# Patient Record
Sex: MALE | Race: WHITE | NOT HISPANIC OR LATINO
[De-identification: names, ages, dates, MRNs, and addresses within clinical notes are randomized per-mention and may not be internally consistent; named-entity substitution may affect disease eponyms.]

---

## 2018-03-15 ENCOUNTER — APPOINTMENT (OUTPATIENT)
Dept: INTERNAL MEDICINE | Facility: CLINIC | Age: 23
End: 2018-03-15
Payer: MEDICAID

## 2018-03-15 VITALS
SYSTOLIC BLOOD PRESSURE: 137 MMHG | TEMPERATURE: 99.2 F | OXYGEN SATURATION: 97 % | WEIGHT: 178 LBS | HEART RATE: 82 BPM | RESPIRATION RATE: 12 BRPM | DIASTOLIC BLOOD PRESSURE: 80 MMHG | BODY MASS INDEX: 26.98 KG/M2 | HEIGHT: 68 IN

## 2018-03-15 DIAGNOSIS — Q62.7 CONGENITAL VESICO-URETERO-RENAL REFLUX: ICD-10-CM

## 2018-03-15 DIAGNOSIS — Z00.00 ENCOUNTER FOR GENERAL ADULT MEDICAL EXAMINATION W/OUT ABNORMAL FINDINGS: ICD-10-CM

## 2018-03-15 PROCEDURE — 99385 PREV VISIT NEW AGE 18-39: CPT

## 2018-03-19 LAB
25(OH)D3 SERPL-MCNC: 20.6 NG/ML
ALBUMIN SERPL ELPH-MCNC: 4.9 G/DL
ALP BLD-CCNC: 66 U/L
ALT SERPL-CCNC: 15 U/L
ANION GAP SERPL CALC-SCNC: 17 MMOL/L
AST SERPL-CCNC: 26 U/L
BILIRUB SERPL-MCNC: 0.3 MG/DL
BUN SERPL-MCNC: 13 MG/DL
CALCIUM SERPL-MCNC: 10.1 MG/DL
CHLORIDE SERPL-SCNC: 99 MMOL/L
CHOLEST SERPL-MCNC: 214 MG/DL
CHOLEST/HDLC SERPL: 5.8 RATIO
CO2 SERPL-SCNC: 23 MMOL/L
CREAT SERPL-MCNC: 0.9 MG/DL
GLUCOSE SERPL-MCNC: 69 MG/DL
HBA1C MFR BLD HPLC: 5.4 %
HDLC SERPL-MCNC: 37 MG/DL
LDLC SERPL CALC-MCNC: 98 MG/DL
POTASSIUM SERPL-SCNC: 4 MMOL/L
PROT SERPL-MCNC: 8.5 G/DL
SODIUM SERPL-SCNC: 139 MMOL/L
TRIGL SERPL-MCNC: 393 MG/DL
TSH SERPL-ACNC: 1.44 UIU/ML
VIT B12 SERPL-MCNC: 766 PG/ML

## 2018-03-22 LAB
BASOPHILS # BLD AUTO: 0.05 K/UL
BASOPHILS NFR BLD AUTO: 0.7 %
EOSINOPHIL # BLD AUTO: 0.14 K/UL
EOSINOPHIL NFR BLD AUTO: 2 %
HCT VFR BLD CALC: 44.4 %
HGB BLD-MCNC: 15 G/DL
IMM GRANULOCYTES NFR BLD AUTO: 0.3 %
LYMPHOCYTES # BLD AUTO: 2.33 K/UL
LYMPHOCYTES NFR BLD AUTO: 32.5 %
MAN DIFF?: NORMAL
MCHC RBC-ENTMCNC: 27.6 PG
MCHC RBC-ENTMCNC: 33.8 GM/DL
MCV RBC AUTO: 81.6 FL
MONOCYTES # BLD AUTO: 0.71 K/UL
MONOCYTES NFR BLD AUTO: 9.9 %
NEUTROPHILS # BLD AUTO: 3.91 K/UL
NEUTROPHILS NFR BLD AUTO: 54.6 %
PLATELET # BLD AUTO: 238 K/UL
RBC # BLD: 5.44 M/UL
RBC # FLD: 13.3 %
WBC # FLD AUTO: 7.16 K/UL

## 2018-04-13 ENCOUNTER — APPOINTMENT (OUTPATIENT)
Dept: INTERNAL MEDICINE | Facility: CLINIC | Age: 23
End: 2018-04-13
Payer: MEDICAID

## 2018-04-13 VITALS
HEIGHT: 68 IN | BODY MASS INDEX: 26.67 KG/M2 | SYSTOLIC BLOOD PRESSURE: 128 MMHG | RESPIRATION RATE: 12 BRPM | HEART RATE: 88 BPM | OXYGEN SATURATION: 95 % | DIASTOLIC BLOOD PRESSURE: 79 MMHG | WEIGHT: 176 LBS | TEMPERATURE: 99.4 F

## 2018-04-13 PROCEDURE — 99213 OFFICE O/P EST LOW 20 MIN: CPT

## 2018-04-20 LAB
CHOLEST SERPL-MCNC: 255 MG/DL
CHOLEST/HDLC SERPL: 5.3 RATIO
HDLC SERPL-MCNC: 48 MG/DL
LDLC SERPL CALC-MCNC: 162 MG/DL
TRIGL SERPL-MCNC: 227 MG/DL

## 2018-07-16 LAB
CHOLEST SERPL-MCNC: 205 MG/DL
CHOLEST/HDLC SERPL: 4.5 RATIO
HDLC SERPL-MCNC: 46 MG/DL
LDLC SERPL CALC-MCNC: 123 MG/DL
TRIGL SERPL-MCNC: 181 MG/DL

## 2018-11-23 ENCOUNTER — EMERGENCY (EMERGENCY)
Facility: HOSPITAL | Age: 23
LOS: 1 days | Discharge: HOME | End: 2018-11-23
Attending: EMERGENCY MEDICINE
Payer: MEDICAID

## 2018-11-23 VITALS
DIASTOLIC BLOOD PRESSURE: 86 MMHG | SYSTOLIC BLOOD PRESSURE: 138 MMHG | WEIGHT: 173.94 LBS | RESPIRATION RATE: 17 BRPM | TEMPERATURE: 99 F | OXYGEN SATURATION: 100 % | HEART RATE: 84 BPM | HEIGHT: 69 IN

## 2018-11-23 LAB
ALBUMIN SERPL ELPH-MCNC: 4.4 G/DL — SIGNIFICANT CHANGE UP (ref 3.3–5)
ALP SERPL-CCNC: 58 U/L — SIGNIFICANT CHANGE UP (ref 40–120)
ALT FLD-CCNC: 14 U/L — SIGNIFICANT CHANGE UP (ref 10–45)
ANION GAP SERPL CALC-SCNC: 14 MMOL/L — SIGNIFICANT CHANGE UP (ref 5–17)
APTT BLD: 31.8 SEC — SIGNIFICANT CHANGE UP (ref 27.5–36.3)
AST SERPL-CCNC: 12 U/L — SIGNIFICANT CHANGE UP (ref 10–40)
BASOPHILS # BLD AUTO: 0 K/UL — SIGNIFICANT CHANGE UP (ref 0–0.2)
BASOPHILS NFR BLD AUTO: 0.3 % — SIGNIFICANT CHANGE UP (ref 0–2)
BILIRUB SERPL-MCNC: 0.2 MG/DL — SIGNIFICANT CHANGE UP (ref 0.2–1.2)
BLD GP AB SCN SERPL QL: NEGATIVE — SIGNIFICANT CHANGE UP
BUN SERPL-MCNC: 15 MG/DL — SIGNIFICANT CHANGE UP (ref 7–23)
CALCIUM SERPL-MCNC: 9.4 MG/DL — SIGNIFICANT CHANGE UP (ref 8.4–10.5)
CHLORIDE SERPL-SCNC: 102 MMOL/L — SIGNIFICANT CHANGE UP (ref 96–108)
CO2 SERPL-SCNC: 23 MMOL/L — SIGNIFICANT CHANGE UP (ref 22–31)
CREAT SERPL-MCNC: 0.94 MG/DL — SIGNIFICANT CHANGE UP (ref 0.5–1.3)
EOSINOPHIL # BLD AUTO: 0.1 K/UL — SIGNIFICANT CHANGE UP (ref 0–0.5)
EOSINOPHIL NFR BLD AUTO: 1.8 % — SIGNIFICANT CHANGE UP (ref 0–6)
GLUCOSE SERPL-MCNC: 100 MG/DL — HIGH (ref 70–99)
HCT VFR BLD CALC: 33.6 % — LOW (ref 39–50)
HGB BLD-MCNC: 12.1 G/DL — LOW (ref 13–17)
INR BLD: 0.99 RATIO — SIGNIFICANT CHANGE UP (ref 0.88–1.16)
LIDOCAIN IGE QN: 59 U/L — SIGNIFICANT CHANGE UP (ref 7–60)
LYMPHOCYTES # BLD AUTO: 1.7 K/UL — SIGNIFICANT CHANGE UP (ref 1–3.3)
LYMPHOCYTES # BLD AUTO: 35.1 % — SIGNIFICANT CHANGE UP (ref 13–44)
MCHC RBC-ENTMCNC: 28.3 PG — SIGNIFICANT CHANGE UP (ref 27–34)
MCHC RBC-ENTMCNC: 36 GM/DL — SIGNIFICANT CHANGE UP (ref 32–36)
MCV RBC AUTO: 78.7 FL — LOW (ref 80–100)
MONOCYTES # BLD AUTO: 0.4 K/UL — SIGNIFICANT CHANGE UP (ref 0–0.9)
MONOCYTES NFR BLD AUTO: 8.1 % — SIGNIFICANT CHANGE UP (ref 2–14)
NEUTROPHILS # BLD AUTO: 2.7 K/UL — SIGNIFICANT CHANGE UP (ref 1.8–7.4)
NEUTROPHILS NFR BLD AUTO: 54.7 % — SIGNIFICANT CHANGE UP (ref 43–77)
OB PNL STL: POSITIVE
PLATELET # BLD AUTO: 220 K/UL — SIGNIFICANT CHANGE UP (ref 150–400)
POTASSIUM SERPL-MCNC: 3.7 MMOL/L — SIGNIFICANT CHANGE UP (ref 3.5–5.3)
POTASSIUM SERPL-SCNC: 3.7 MMOL/L — SIGNIFICANT CHANGE UP (ref 3.5–5.3)
PROT SERPL-MCNC: 7.3 G/DL — SIGNIFICANT CHANGE UP (ref 6–8.3)
PROTHROM AB SERPL-ACNC: 11.3 SEC — SIGNIFICANT CHANGE UP (ref 10–12.9)
RBC # BLD: 4.27 M/UL — SIGNIFICANT CHANGE UP (ref 4.2–5.8)
RBC # FLD: 12.6 % — SIGNIFICANT CHANGE UP (ref 10.3–14.5)
RH IG SCN BLD-IMP: POSITIVE — SIGNIFICANT CHANGE UP
SODIUM SERPL-SCNC: 139 MMOL/L — SIGNIFICANT CHANGE UP (ref 135–145)
WBC # BLD: 4.9 K/UL — SIGNIFICANT CHANGE UP (ref 3.8–10.5)
WBC # FLD AUTO: 4.9 K/UL — SIGNIFICANT CHANGE UP (ref 3.8–10.5)

## 2018-11-23 PROCEDURE — 85730 THROMBOPLASTIN TIME PARTIAL: CPT

## 2018-11-23 PROCEDURE — 83690 ASSAY OF LIPASE: CPT

## 2018-11-23 PROCEDURE — 85610 PROTHROMBIN TIME: CPT

## 2018-11-23 PROCEDURE — 82272 OCCULT BLD FECES 1-3 TESTS: CPT

## 2018-11-23 PROCEDURE — 99284 EMERGENCY DEPT VISIT MOD MDM: CPT

## 2018-11-23 PROCEDURE — 80053 COMPREHEN METABOLIC PANEL: CPT

## 2018-11-23 PROCEDURE — 99283 EMERGENCY DEPT VISIT LOW MDM: CPT

## 2018-11-23 PROCEDURE — 86850 RBC ANTIBODY SCREEN: CPT

## 2018-11-23 PROCEDURE — 86900 BLOOD TYPING SEROLOGIC ABO: CPT

## 2018-11-23 PROCEDURE — 86901 BLOOD TYPING SEROLOGIC RH(D): CPT

## 2018-11-23 PROCEDURE — 85027 COMPLETE CBC AUTOMATED: CPT

## 2018-11-23 RX ORDER — ONDANSETRON 8 MG/1
4 TABLET, FILM COATED ORAL ONCE
Qty: 0 | Refills: 0 | Status: DISCONTINUED | OUTPATIENT
Start: 2018-11-23 | End: 2018-11-27

## 2018-11-23 RX ORDER — ACETAMINOPHEN 500 MG
975 TABLET ORAL ONCE
Qty: 0 | Refills: 0 | Status: DISCONTINUED | OUTPATIENT
Start: 2018-11-23 | End: 2018-11-27

## 2018-11-23 RX ORDER — SODIUM CHLORIDE 9 MG/ML
1000 INJECTION INTRAMUSCULAR; INTRAVENOUS; SUBCUTANEOUS ONCE
Qty: 0 | Refills: 0 | Status: DISCONTINUED | OUTPATIENT
Start: 2018-11-23 | End: 2018-11-27

## 2018-11-23 RX ORDER — SODIUM CHLORIDE 9 MG/ML
1000 INJECTION INTRAMUSCULAR; INTRAVENOUS; SUBCUTANEOUS ONCE
Qty: 0 | Refills: 0 | Status: COMPLETED | OUTPATIENT
Start: 2018-11-23 | End: 2018-11-23

## 2018-11-23 RX ADMIN — SODIUM CHLORIDE 2000 MILLILITER(S): 9 INJECTION INTRAMUSCULAR; INTRAVENOUS; SUBCUTANEOUS at 12:39

## 2018-11-23 NOTE — ED PROVIDER NOTE - PHYSICAL EXAMINATION
diffusely tender abd, no rebound, no guarding nondist  clear lung  heart  gross blood on rectal exam  otherwise rnormal rectal anus  pale  got sycnopal when he stood up fas

## 2018-11-23 NOTE — ED PROVIDER NOTE - PROGRESS NOTE DETAILS
Labs showed Hgb 12.1.  Reviewed historical chart in allscripts which showed Hgb ~15.  Last bloody bowel movement was last night so current Hgb is likely accurate.  Patient is feeling well, tolerating PO and looks comfortable in bed.  Received 2 L IVF.  VSS.  No BMs or bloody BMs in ED stay.  Patient can fu with his GI doctor who he sees regularly on Monday and will call to make appointment.  Will give patient his results from this ED visit.  Will give discharge instructions.

## 2018-11-23 NOTE — ED ADULT NURSE NOTE - NSIMPLEMENTINTERV_GEN_ALL_ED
Implemented All Universal Safety Interventions:  [place] to call system. Call bell, personal items and telephone within reach. Instruct patient to call for assistance. Room bathroom lighting operational. Non-slip footwear when patient is off stretcher. Physically safe environment: no spills, clutter or unnecessary equipment. Stretcher in lowest position, wheels locked, appropriate side rails in place.

## 2018-11-23 NOTE — ED PROVIDER NOTE - MEDICAL DECISION MAKING DETAILS
History of dieulafoy and hematochezia now with melena and hematochezia, symptoms of anemia.  Will vss, no hematemesis.  Will send fro cbc, cmp, coags, type and screen, five ivf.  Patient taking pantoprazole.

## 2018-11-23 NOTE — ED PROVIDER NOTE - ATTENDING CONTRIBUTION TO CARE
I performed a history and physical exam of the patient and discussed their management with the resident. I reviewed the resident's note and agree with the documented findings and plan of care.  Cookie Vasquez MD

## 2018-11-23 NOTE — ED PROVIDER NOTE - OBJECTIVE STATEMENT
22 y/o M with pmhx of asthma p/w rectal bleeding since endoscopy 2 weeks ago. Pt reports noticing dark stools with dark red blood. Had 7 bm's last night with last one being at 3 am today, noticed last bm with bright red blood. Has also been tired and lightheaded since yesterday. Reports intermittent palpitations and abdominal "pinching" on L side. Also notes rectal pain with bm. Pt has a hx of Dieulafoy's lesion. Denies fever, n/v, cp, sob. No recent abx use.  GI: Dr. Pedro Larose 22 y/o M with pmhx of asthma, Dieulafoy lesions with hematochezia and anemia occasionally requiring transfusions p/w rectal bleeding since endoscopy 2 weeks ago.  Pt reports noticing dark stools for past two weeks intermittently.  Last night, had 7 bm's with last one being at 3 am today, noticed last bm with bright red blood surrounding dark stool x1. Has also been tired and lightheaded since yesterday. Reports abdominal "pinching" sensation on L side. Also notes rectal pain with bowel movement that he thinks is 2/2 rawness from wiping.  Denies fever, n/v, cp, sob. No recent abx use or travel.  GI: Dr. Pedro Larose

## 2018-11-23 NOTE — ED PROVIDER NOTE - NS_ ATTENDINGSCRIBEDETAILS _ED_A_ED_FT
I performed a history and physical exam of the patient and discussed their management with the resident. I reviewed the scribe's note and agree with the documented findings and plan of care.  Cookie Vasquez MD

## 2018-11-23 NOTE — ED ADULT NURSE NOTE - OBJECTIVE STATEMENT
23 y.o male c c/o BRBPR. Pt had endoscopy 2 weeks ago for a preexisting ulcer. Pt was told to expect minimal rectal bleeding for 1 week. Stool was dark in appearance now transitioning into BRB. Tired and dizzy yesterday. Pt has had to get PRBC during an admission for low h/h. Father at bedside. Pt currently no on medication. Had been on Protonix in the past.

## 2018-11-27 ENCOUNTER — APPOINTMENT (OUTPATIENT)
Dept: INTERNAL MEDICINE | Facility: CLINIC | Age: 23
End: 2018-11-27
Payer: MEDICAID

## 2018-11-27 VITALS
SYSTOLIC BLOOD PRESSURE: 131 MMHG | HEART RATE: 99 BPM | WEIGHT: 166 LBS | OXYGEN SATURATION: 99 % | BODY MASS INDEX: 25.16 KG/M2 | RESPIRATION RATE: 12 BRPM | DIASTOLIC BLOOD PRESSURE: 84 MMHG | TEMPERATURE: 98.9 F | HEIGHT: 68 IN

## 2018-11-27 PROCEDURE — 99214 OFFICE O/P EST MOD 30 MIN: CPT

## 2018-11-28 ENCOUNTER — INPATIENT (INPATIENT)
Facility: HOSPITAL | Age: 23
LOS: 2 days | Discharge: HOME | DRG: 379 | End: 2018-12-01
Attending: HOSPITALIST | Admitting: INTERNAL MEDICINE
Payer: MEDICAID

## 2018-11-28 VITALS
OXYGEN SATURATION: 100 % | RESPIRATION RATE: 20 BRPM | HEIGHT: 69 IN | HEART RATE: 129 BPM | TEMPERATURE: 98 F | DIASTOLIC BLOOD PRESSURE: 84 MMHG | WEIGHT: 166.01 LBS | SYSTOLIC BLOOD PRESSURE: 119 MMHG

## 2018-11-28 DIAGNOSIS — D64.9 ANEMIA, UNSPECIFIED: ICD-10-CM

## 2018-11-28 LAB
ALBUMIN SERPL ELPH-MCNC: 4.3 G/DL — SIGNIFICANT CHANGE UP (ref 3.3–5)
ALP SERPL-CCNC: 52 U/L — SIGNIFICANT CHANGE UP (ref 40–120)
ALT FLD-CCNC: 10 U/L — SIGNIFICANT CHANGE UP (ref 10–45)
ANION GAP SERPL CALC-SCNC: 15 MMOL/L — SIGNIFICANT CHANGE UP (ref 5–17)
APTT BLD: 29.7 SEC — SIGNIFICANT CHANGE UP (ref 27.5–36.3)
AST SERPL-CCNC: 11 U/L — SIGNIFICANT CHANGE UP (ref 10–40)
BASE EXCESS BLDV CALC-SCNC: 2.4 MMOL/L — HIGH (ref -2–2)
BASOPHILS # BLD AUTO: 0.1 K/UL — SIGNIFICANT CHANGE UP (ref 0–0.2)
BASOPHILS NFR BLD AUTO: 0.9 % — SIGNIFICANT CHANGE UP (ref 0–2)
BILIRUB SERPL-MCNC: 0.3 MG/DL — SIGNIFICANT CHANGE UP (ref 0.2–1.2)
BLD GP AB SCN SERPL QL: NEGATIVE — SIGNIFICANT CHANGE UP
BUN SERPL-MCNC: 17 MG/DL — SIGNIFICANT CHANGE UP (ref 7–23)
CA-I SERPL-SCNC: 1.22 MMOL/L — SIGNIFICANT CHANGE UP (ref 1.12–1.3)
CALCIUM SERPL-MCNC: 9.2 MG/DL — SIGNIFICANT CHANGE UP (ref 8.4–10.5)
CHLORIDE BLDV-SCNC: 103 MMOL/L — SIGNIFICANT CHANGE UP (ref 96–108)
CHLORIDE SERPL-SCNC: 102 MMOL/L — SIGNIFICANT CHANGE UP (ref 96–108)
CO2 BLDV-SCNC: 28 MMOL/L — SIGNIFICANT CHANGE UP (ref 22–30)
CO2 SERPL-SCNC: 23 MMOL/L — SIGNIFICANT CHANGE UP (ref 22–31)
CREAT SERPL-MCNC: 1 MG/DL — SIGNIFICANT CHANGE UP (ref 0.5–1.3)
EOSINOPHIL # BLD AUTO: 0.1 K/UL — SIGNIFICANT CHANGE UP (ref 0–0.5)
EOSINOPHIL NFR BLD AUTO: 1.2 % — SIGNIFICANT CHANGE UP (ref 0–6)
GAS PNL BLDV: 137 MMOL/L — SIGNIFICANT CHANGE UP (ref 136–145)
GAS PNL BLDV: SIGNIFICANT CHANGE UP
GAS PNL BLDV: SIGNIFICANT CHANGE UP
GLUCOSE BLDV-MCNC: 105 MG/DL — HIGH (ref 70–99)
GLUCOSE SERPL-MCNC: 111 MG/DL — HIGH (ref 70–99)
HCO3 BLDV-SCNC: 27 MMOL/L — SIGNIFICANT CHANGE UP (ref 21–29)
HCT VFR BLD CALC: 26.1 % — LOW (ref 39–50)
HCT VFR BLDA CALC: 28 % — LOW (ref 39–50)
HGB BLD CALC-MCNC: 9 G/DL — LOW (ref 13–17)
HGB BLD-MCNC: 9.3 G/DL — LOW (ref 13–17)
INR BLD: 1.13 RATIO — SIGNIFICANT CHANGE UP (ref 0.88–1.16)
LACTATE BLDV-MCNC: 1.4 MMOL/L — SIGNIFICANT CHANGE UP (ref 0.7–2)
LIDOCAIN IGE QN: 17 U/L — SIGNIFICANT CHANGE UP (ref 7–60)
LYMPHOCYTES # BLD AUTO: 1.7 K/UL — SIGNIFICANT CHANGE UP (ref 1–3.3)
LYMPHOCYTES # BLD AUTO: 29.9 % — SIGNIFICANT CHANGE UP (ref 13–44)
MCHC RBC-ENTMCNC: 27.9 PG — SIGNIFICANT CHANGE UP (ref 27–34)
MCHC RBC-ENTMCNC: 35.7 GM/DL — SIGNIFICANT CHANGE UP (ref 32–36)
MCV RBC AUTO: 78.1 FL — LOW (ref 80–100)
MONOCYTES # BLD AUTO: 0.5 K/UL — SIGNIFICANT CHANGE UP (ref 0–0.9)
MONOCYTES NFR BLD AUTO: 9.2 % — SIGNIFICANT CHANGE UP (ref 2–14)
NEUTROPHILS # BLD AUTO: 3.4 K/UL — SIGNIFICANT CHANGE UP (ref 1.8–7.4)
NEUTROPHILS NFR BLD AUTO: 58.7 % — SIGNIFICANT CHANGE UP (ref 43–77)
PCO2 BLDV: 45 MMHG — SIGNIFICANT CHANGE UP (ref 35–50)
PH BLDV: 7.39 — SIGNIFICANT CHANGE UP (ref 7.35–7.45)
PLATELET # BLD AUTO: 255 K/UL — SIGNIFICANT CHANGE UP (ref 150–400)
PO2 BLDV: 21 MMHG — SIGNIFICANT CHANGE UP (ref 25–45)
POTASSIUM BLDV-SCNC: 3.1 MMOL/L — LOW (ref 3.5–5.3)
POTASSIUM SERPL-MCNC: 3.4 MMOL/L — LOW (ref 3.5–5.3)
POTASSIUM SERPL-SCNC: 3.4 MMOL/L — LOW (ref 3.5–5.3)
PROT SERPL-MCNC: 6.8 G/DL — SIGNIFICANT CHANGE UP (ref 6–8.3)
PROTHROM AB SERPL-ACNC: 13 SEC — HIGH (ref 10–12.9)
RBC # BLD: 3.34 M/UL — LOW (ref 4.2–5.8)
RBC # FLD: 12.4 % — SIGNIFICANT CHANGE UP (ref 10.3–14.5)
RH IG SCN BLD-IMP: POSITIVE — SIGNIFICANT CHANGE UP
SAO2 % BLDV: 25 % — LOW (ref 67–88)
SODIUM SERPL-SCNC: 140 MMOL/L — SIGNIFICANT CHANGE UP (ref 135–145)
WBC # BLD: 5.7 K/UL — SIGNIFICANT CHANGE UP (ref 3.8–10.5)
WBC # FLD AUTO: 5.7 K/UL — SIGNIFICANT CHANGE UP (ref 3.8–10.5)

## 2018-11-28 PROCEDURE — 99285 EMERGENCY DEPT VISIT HI MDM: CPT

## 2018-11-28 PROCEDURE — 71046 X-RAY EXAM CHEST 2 VIEWS: CPT | Mod: 26

## 2018-11-28 RX ORDER — PANTOPRAZOLE SODIUM 20 MG/1
40 TABLET, DELAYED RELEASE ORAL ONCE
Qty: 0 | Refills: 0 | Status: COMPLETED | OUTPATIENT
Start: 2018-11-28 | End: 2018-11-28

## 2018-11-28 RX ADMIN — PANTOPRAZOLE SODIUM 40 MILLIGRAM(S): 20 TABLET, DELAYED RELEASE ORAL at 20:40

## 2018-11-28 NOTE — PHYSICAL EXAM
[No Acute Distress] : no acute distress [Well Nourished] : well nourished [Well Developed] : well developed [Well-Appearing] : well-appearing [Normal Sclera/Conjunctiva] : normal sclera/conjunctiva [Normal Outer Ear/Nose] : the outer ears and nose were normal in appearance [No JVD] : no jugular venous distention [No Lymphadenopathy] : no lymphadenopathy [No Respiratory Distress] : no respiratory distress  [Clear to Auscultation] : lungs were clear to auscultation bilaterally [No Accessory Muscle Use] : no accessory muscle use [Normal Rate] : normal rate  [Regular Rhythm] : with a regular rhythm [Normal S1, S2] : normal S1 and S2 [No Murmur] : no murmur heard [No Edema] : there was no peripheral edema [No Extremity Clubbing/Cyanosis] : no extremity clubbing/cyanosis [Soft] : abdomen soft [Non Tender] : non-tender [Non-distended] : non-distended [No HSM] : no HSM [Normal Bowel Sounds] : normal bowel sounds [Normal Anterior Cervical Nodes] : no anterior cervical lymphadenopathy [No CVA Tenderness] : no CVA  tenderness [No Joint Swelling] : no joint swelling [No Rash] : no rash [Normal Gait] : normal gait [No Focal Deficits] : no focal deficits [Speech Grossly Normal] : speech grossly normal [Normal Affect] : the affect was normal [Normal Insight/Judgement] : insight and judgment were intact

## 2018-11-28 NOTE — ED ADULT TRIAGE NOTE - CHIEF COMPLAINT QUOTE
pt states, "I was here x4 days ago for rectal bleeding. monday I had an episode but today it got worse. it started out dark but now is bright red"

## 2018-11-28 NOTE — ED PROVIDER NOTE - MEDICAL DECISION MAKING DETAILS
Continuation of GI bleed, worsening symptoms. Possibly from Dieulafoy lesions(last endoscopy with lesion in stomach, cauterized 2015). Cant get GI f/u as GI doc on vacation. Check labs, protonix. Admit. Continuation of GI bleed, worsening symptoms. Possibly from Dieulafoy lesions(last endoscopy with lesion in stomach, cauterized 2015). Cant get GI f/u as GI doc on vacation. Check labs, protonix. Admit.    Attending MD Carvajal: 24 y/o M with pmhx of asthma, Dieulafoy lesions(last endoscopy with lesion in stomach, cauterized), anemia occasionally requiring transfusions p/w rectal bleeding. Seen in ED 4 days ago for BRBPR and hgb stable at that time.  Discharged for GI follow up but had another episode of BRBPR today and felt worsening fatigue and came in for evaluation.  On exam the abd benign + BRBPR.  Will send CBC, type and screen and transfuse as needed.  PPI.

## 2018-11-28 NOTE — ED ADULT NURSE NOTE - NSIMPLEMENTINTERV_GEN_ALL_ED
Implemented All Universal Safety Interventions:  [place] to call system. Call bell, personal items and telephone within reach. Instruct patient to call for assistance. Room bathroom lighting operational. Non-slip footwear when patient is off stretcher. Physically safe environment: no spills, clutter or unnecessary equipment. Stretcher in lowest position, wheels locked, appropriate side rails in place.

## 2018-11-28 NOTE — ED ADULT NURSE NOTE - OBJECTIVE STATEMENT
24 y/o male, a&o x3, c/o dark stools with generalized abd discomfort x3 days. Pt has hx GI bleed in past requiring transfusion. Denies headache, weakness, dizziness, fevers, or chills. Breathing even, full, unlabored, no cough, dyspnea upon exertion resolved at rest. Abdomen soft, nondistended, no constipation, no diarrhea, no nausea, no vomiting, no urinary complaints. Skin warm, dry, pale. Stretcher locked in low position. Advised of plan of care. Family at bedside.

## 2018-11-28 NOTE — ED PROVIDER NOTE - ATTENDING CONTRIBUTION TO CARE
Attending MD Carvajal:  I personally have seen and examined this patient.  Resident note reviewed and agree on plan of care and except where noted.  See MDM for details.

## 2018-11-28 NOTE — ED PROVIDER NOTE - PHYSICAL EXAMINATION
Gen: No acute distress, alert, cooperative, Pale  Head: Normocephalic, Atraumatic  HEENT: PERRL, oral mucosa moist, normal conjunctiva  Lung: CTAB, no respiratory distress, no crackles or wheezes  CV: rrr, no murmur  Abd: soft, diffuse mild tenderness, ND, no rebound or guarding  MSK: No LE edema, no visible deformities  Neuro: No focal neurologic deficits  Skin: Warm and dry, no evidence of rash, PALE  Psych: normal affect, follows commands

## 2018-11-28 NOTE — ED ADULT NURSE REASSESSMENT NOTE - NS ED NURSE REASSESS COMMENT FT1
Pt a&o x3, resting on stretcher, in no acute distress, vital signs stable. PRBC's initiated. Report given to floor RN. 15 min post transfusion start vitals to be performed prior to pt going upstairs. Stretcher locked in low position. Advised of plan of care.

## 2018-11-28 NOTE — ED PROVIDER NOTE - OBJECTIVE STATEMENT
22 y/o M with pmhx of asthma, Dieulafoy lesions(last endoscopy with lesion in stomach, cauterized), anemia occasionally requiring transfusions p/w rectal bleeding. Was seen in the ED 4 days ago for same complaint and discharged with GI f/u. Had not had any bleeding until today when he saw dark stool and bright red blood. Also has worsening weakness and fatigue. Patient has abdominal pain, similar to ED 4 days ago. Today noted some new mild intermittent chest pain, currently cp free.  Denies fever, n/v, sob. No recent abx use or travel.

## 2018-11-28 NOTE — HISTORY OF PRESENT ILLNESS
[de-identified] : 22 yo male, presents for post ER visit follow up.\par Pt states he had black stool last Thursday night and later on in the night had some bright blood mixed in. He went to [facility]  ER on Friday He was evaluated in the ER, had blood work done, given IV medicine and sent home\par Denies having abdominal pain, diarrhea. Had small bowel movement yesterday and as per pt was still black.  He says he is feeling weak, has been eating bland diet and has been drinking a lot of fluid\par He has hx of gastric bleed 3 years ago "from a duLefois lesion". he  follows with GI and had endoscopy on Oct 30th \par

## 2018-11-29 DIAGNOSIS — D64.9 ANEMIA, UNSPECIFIED: ICD-10-CM

## 2018-11-29 DIAGNOSIS — K62.5 HEMORRHAGE OF ANUS AND RECTUM: ICD-10-CM

## 2018-11-29 DIAGNOSIS — N28.9 DISORDER OF KIDNEY AND URETER, UNSPECIFIED: Chronic | ICD-10-CM

## 2018-11-29 DIAGNOSIS — Z29.9 ENCOUNTER FOR PROPHYLACTIC MEASURES, UNSPECIFIED: ICD-10-CM

## 2018-11-29 DIAGNOSIS — K31.82 DIEULAFOY LESION (HEMORRHAGIC) OF STOMACH AND DUODENUM: ICD-10-CM

## 2018-11-29 DIAGNOSIS — J45.909 UNSPECIFIED ASTHMA, UNCOMPLICATED: ICD-10-CM

## 2018-11-29 LAB
ANION GAP SERPL CALC-SCNC: 16 MMOL/L — SIGNIFICANT CHANGE UP (ref 5–17)
APTT BLD: 31 SEC — SIGNIFICANT CHANGE UP (ref 27.5–36.3)
BUN SERPL-MCNC: 18 MG/DL — SIGNIFICANT CHANGE UP (ref 7–23)
CALCIUM SERPL-MCNC: 9.1 MG/DL — SIGNIFICANT CHANGE UP (ref 8.4–10.5)
CHLORIDE SERPL-SCNC: 103 MMOL/L — SIGNIFICANT CHANGE UP (ref 96–108)
CO2 SERPL-SCNC: 21 MMOL/L — LOW (ref 22–31)
CREAT SERPL-MCNC: 1.09 MG/DL — SIGNIFICANT CHANGE UP (ref 0.5–1.3)
GLUCOSE SERPL-MCNC: 90 MG/DL — SIGNIFICANT CHANGE UP (ref 70–99)
HCT VFR BLD CALC: 27.3 % — LOW (ref 39–50)
HCT VFR BLD CALC: 31.1 % — LOW (ref 39–50)
HGB BLD-MCNC: 10 G/DL — LOW (ref 13–17)
HGB BLD-MCNC: 11.1 G/DL — LOW (ref 13–17)
MCHC RBC-ENTMCNC: 28.4 PG — SIGNIFICANT CHANGE UP (ref 27–34)
MCHC RBC-ENTMCNC: 29 PG — SIGNIFICANT CHANGE UP (ref 27–34)
MCHC RBC-ENTMCNC: 35.7 GM/DL — SIGNIFICANT CHANGE UP (ref 32–36)
MCHC RBC-ENTMCNC: 36.5 GM/DL — HIGH (ref 32–36)
MCV RBC AUTO: 79.3 FL — LOW (ref 80–100)
MCV RBC AUTO: 79.6 FL — LOW (ref 80–100)
PLATELET # BLD AUTO: 237 K/UL — SIGNIFICANT CHANGE UP (ref 150–400)
PLATELET # BLD AUTO: 249 K/UL — SIGNIFICANT CHANGE UP (ref 150–400)
POTASSIUM SERPL-MCNC: 4 MMOL/L — SIGNIFICANT CHANGE UP (ref 3.5–5.3)
POTASSIUM SERPL-SCNC: 4 MMOL/L — SIGNIFICANT CHANGE UP (ref 3.5–5.3)
RBC # BLD: 3.45 M/UL — LOW (ref 4.2–5.8)
RBC # BLD: 3.91 M/UL — LOW (ref 4.2–5.8)
RBC # FLD: 12.5 % — SIGNIFICANT CHANGE UP (ref 10.3–14.5)
RBC # FLD: 13 % — SIGNIFICANT CHANGE UP (ref 10.3–14.5)
SODIUM SERPL-SCNC: 140 MMOL/L — SIGNIFICANT CHANGE UP (ref 135–145)
WBC # BLD: 6 K/UL — SIGNIFICANT CHANGE UP (ref 3.8–10.5)
WBC # BLD: 6.4 K/UL — SIGNIFICANT CHANGE UP (ref 3.8–10.5)
WBC # FLD AUTO: 6 K/UL — SIGNIFICANT CHANGE UP (ref 3.8–10.5)
WBC # FLD AUTO: 6.4 K/UL — SIGNIFICANT CHANGE UP (ref 3.8–10.5)

## 2018-11-29 PROCEDURE — 12345: CPT | Mod: NC,GC

## 2018-11-29 PROCEDURE — 99223 1ST HOSP IP/OBS HIGH 75: CPT | Mod: GC

## 2018-11-29 RX ORDER — PANTOPRAZOLE SODIUM 20 MG/1
40 TABLET, DELAYED RELEASE ORAL
Qty: 0 | Refills: 0 | Status: DISCONTINUED | OUTPATIENT
Start: 2018-11-29 | End: 2018-11-29

## 2018-11-29 RX ORDER — PANTOPRAZOLE SODIUM 20 MG/1
8 TABLET, DELAYED RELEASE ORAL
Qty: 80 | Refills: 0 | Status: DISCONTINUED | OUTPATIENT
Start: 2018-11-29 | End: 2018-11-30

## 2018-11-29 RX ORDER — POTASSIUM CHLORIDE 20 MEQ
40 PACKET (EA) ORAL ONCE
Qty: 0 | Refills: 0 | Status: COMPLETED | OUTPATIENT
Start: 2018-11-29 | End: 2018-11-29

## 2018-11-29 RX ADMIN — PANTOPRAZOLE SODIUM 10 MG/HR: 20 TABLET, DELAYED RELEASE ORAL at 03:09

## 2018-11-29 RX ADMIN — Medication 40 MILLIEQUIVALENT(S): at 02:46

## 2018-11-29 NOTE — H&P ADULT - PROBLEM SELECTOR PLAN 1
Patient presenting with 1 week history of intermittent melena with BRB, found to have Hgb of 9.3, had been 12.1 on 11/23  - Currently hemodynamically stable, breathing comfortably on RA  - recently taken off of chronic PPI by outpatient GI  - likely secondary to UGIB, ulcer vs Dieulafoy lesion (although negative EGD a couple of weeks ago, may have manipulated old lesion)  - will call GI for possible EGD, keep NPO  - will transfuse 1 unit of pRBC's and follow up CBC afterwards if another unit is necessary   - s/p Protonix 40 IV in ED, will c/w Protonix 40 IV BID   - will continue to monitor vitals q 4, will check set of orthostatics   - active type and screen Patient presenting with 1 week history of intermittent melena with BRB, found to have Hgb of 9.3, had been 12.1 on 11/23  - FOBT +  - Currently hemodynamically stable, breathing comfortably on RA  - recently taken off of chronic PPI by outpatient GI  - likely secondary to UGIB, ulcer vs Dieulafoy lesion (although negative EGD a couple of weeks ago, may have manipulated old lesion)  - will call GI for possible EGD, keep NPO  - will transfuse 1 unit of pRBC's and follow up CBC afterwards if another unit is necessary   - s/p Protonix 40 IV in ED, will c/w Protonix 40 IV BID   - will continue to monitor vitals q 4, will check set of orthostatics   - active type and screen Patient presenting with 1 week history of intermittent melena with BRB, found to have Hgb of 9.3, had been 12.1 on 11/23  - FOBT +  - Currently hemodynamically stable, but had been tachycardic, breathing comfortably on RA  - recently taken off of chronic PPI by outpatient GI  - likely secondary to UGIB, ulcer vs Dieulafoy lesion (although negative EGD a couple of weeks ago, may have manipulated old lesion)  - will consult GI for possible EGD, keep NPO  - will transfuse 1 unit of pRBC's and follow up CBC afterwards if another unit is necessary   - s/p Protonix 40 IV in ED, will start PPI drip, and then transition to Protonix 40 IV BID   - will continue to monitor vitals q 4, will check set of orthostatics   - active type and screen Patient presenting with 1 week history of intermittent melena with BRBPR/dark stools, found to have Hgb of 9.3, had been 12.1 on 11/23  - FOBT +  - Currently hemodynamically stable, but had been tachycardic, breathing comfortably on RA  - recently taken off of chronic PPI by outpatient GI  - likely secondary to UGIB, ulcer vs Dieulafoy lesion (although negative EGD a couple of weeks ago, may have manipulated old lesion)  - will consult GI for possible EGD, keep NPO  - will transfuse 1 unit of pRBC's and follow up CBC afterwards if another unit is necessary   - s/p Protonix 40 IV in ED, will start PPI drip, and then transition to Protonix 40 IV BID   - will continue to monitor vitals q 4, will check set of orthostatics   - active type and screen

## 2018-11-29 NOTE — PROGRESS NOTE ADULT - PROBLEM SELECTOR PLAN 4
Patient with stable intermittent asthma, has not used Ventolin PRN in a long time  - currently without any symptoms

## 2018-11-29 NOTE — PROGRESS NOTE ADULT - ASSESSMENT
24 y/o Male with PMHx of asthma, GERD, Dieulafoy lesions (GI bleeding 3 years ago requiring 2 transfusions, EGD showed lesion in stomach, which was cauterized) presents with 1 week history of melena intermixed with BRB, found to have Hgb of 9.3, likely secondary to upper GI bleed.

## 2018-11-29 NOTE — H&P ADULT - NSHPREVIEWOFSYSTEMS_GEN_ALL_CORE
Review of Systems:  Constitutional: No fever, No weight loss  Eyes: No blurry vision, No diplopia  Neuro: No tremors, No muscle weakness   Cardiovascular: No chest pain, No palpitations  Respiratory: +PURVIS, No cough  GI: + nausea, No vomiting, +melena  : No dysuria, No hematuria  Skin: No rash

## 2018-11-29 NOTE — H&P ADULT - PROBLEM SELECTOR PLAN 3
Patient with history of Dieulafoy lesion 3 years which cause acute blood loss anemia requiring 2 units of pRBC's  - Plan as above for current GI bleed

## 2018-11-29 NOTE — CONSULT NOTE ADULT - ASSESSMENT
Impression:  1) Melena/hematochezia - 2/2 GIB DDx includes PUD, angiectasias,  Dieulafoy lesions (especially given the ptients history).  Given mix of melena and BRB lower GI, especially right sided lesions is also possible.    Recommendations:   - monitor H/H   - transfuse if hemoglobin <7   - PPI gtt    Zoila Herron, PGY-4  Gastroenterology Fellow  Pager x [phone] or [phone]  (After 5 pm or on weekends please page GI on call) Impression:  1) Melena/hematochezia - 2/2 GIB DDx includes PUD, angiectasias,  Dieulafoy lesions (especially given the ptients history).  Given mix of melena and BRB lower GI, especially right sided lesions is also possible.    Recommendations:   - monitor H/H   - transfuse if hemoglobin <7   - PPI gtt   - EGD tomorrow    Zoila Herron, PGY-4  Gastroenterology Fellow  Pager x [phone] or [phone]  (After 5 pm or on weekends please page GI on call)

## 2018-11-29 NOTE — H&P ADULT - FAMILY HISTORY
Grandparent  Still living? Unknown  Family history of diabetes mellitus, Age at diagnosis: Age Unknown     Uncle  Still living? Unknown  Family history of acute myocardial infarction, Age at diagnosis: Age Unknown

## 2018-11-29 NOTE — PROGRESS NOTE ADULT - SUBJECTIVE AND OBJECTIVE BOX
Alexey Chau MD PGY1  [phone]  Medicine Team 4      Patient is a 23y old  Male who presents with a chief complaint of GI bleeding (29 Nov 2018 01:39)      SUBJECTIVE / OVERNIGHT EVENTS:    MEDICATIONS  (STANDING):  pantoprazole Infusion 8 mG/Hr (10 mL/Hr) IV Continuous <Continuous>    MEDICATIONS  (PRN):      Vital Signs Last 24 Hrs  T(C): 37.2 (29 Nov 2018 02:05), Max: 37.6 (28 Nov 2018 22:14)  T(F): 98.9 (29 Nov 2018 02:05), Max: 99.6 (28 Nov 2018 22:14)  HR: 84 (29 Nov 2018 02:05) (84 - 129)  BP: 124/75 (29 Nov 2018 02:05) (119/84 - 134/76)  BP(mean): --  RR: 16 (29 Nov 2018 02:05) (16 - 20)  SpO2: 98% (29 Nov 2018 02:05) (98% - 100%)  CAPILLARY BLOOD GLUCOSE        I&O's Summary    28 Nov 2018 07:01  -  29 Nov 2018 07:00  --------------------------------------------------------  IN: 50 mL / OUT: 0 mL / NET: 50 mL        PHYSICAL EXAM:  GENERAL: NAD, well-developed  HEAD:  Atraumatic, Normocephalic  EYES: EOMI, PERRLA, conjunctiva and sclera clear  NECK: Supple, No JVD  CHEST/LUNG: Clear to auscultation bilaterally; No wheeze  HEART: Regular rate and rhythm; No murmurs, rubs, or gallops  ABDOMEN: Soft, Nontender, Nondistended; Bowel sounds present  EXTREMITIES:  2+ Peripheral Pulses, No clubbing, cyanosis, or edema  PSYCH: AAOx3  NEUROLOGY: non-focal  SKIN: No rashes or lesions    LABS:                        10.0   6.4   )-----------( 237      ( 29 Nov 2018 04:14 )             27.3     11-29    140  |  103  |  18  ----------------------------<  90  4.0   |  21<L>  |  1.09    Ca    9.1      29 Nov 2018 04:14    TPro  6.8  /  Alb  4.3  /  TBili  0.3  /  DBili  x   /  AST  11  /  ALT  10  /  AlkPhos  52  11-28    PT/INR - ( 28 Nov 2018 20:51 )   PT: 13.0 sec;   INR: 1.13 ratio         PTT - ( 28 Nov 2018 20:51 )  PTT:29.7 sec          RADIOLOGY & ADDITIONAL TESTS:    Imaging Personally Reviewed:    Consultant(s) Notes Reviewed:      Care Discussed with Consultants/Other Providers: Alexey Chau MD PGY1  [phone]  Medicine Team 4      Patient is a 23y old  Male who presents with a chief complaint of GI bleeding (29 Nov 2018 01:39)      SUBJECTIVE / OVERNIGHT EVENTS:  No acute overnight events.   Denies any CP, SOB, N/V, abd pain, F/C. Pt said he had small bm last night that was dark. No bright red blood. Pt did not have bm since. No hematemesis    MEDICATIONS  (STANDING):  pantoprazole Infusion 8 mG/Hr (10 mL/Hr) IV Continuous <Continuous>    MEDICATIONS  (PRN):      Vital Signs Last 24 Hrs  T(C): 37.2 (29 Nov 2018 02:05), Max: 37.6 (28 Nov 2018 22:14)  T(F): 98.9 (29 Nov 2018 02:05), Max: 99.6 (28 Nov 2018 22:14)  HR: 84 (29 Nov 2018 02:05) (84 - 129)  BP: 124/75 (29 Nov 2018 02:05) (119/84 - 134/76)  BP(mean): --  RR: 16 (29 Nov 2018 02:05) (16 - 20)  SpO2: 98% (29 Nov 2018 02:05) (98% - 100%)  CAPILLARY BLOOD GLUCOSE        I&O's Summary    28 Nov 2018 07:01  -  29 Nov 2018 07:00  --------------------------------------------------------  IN: 50 mL / OUT: 0 mL / NET: 50 mL        PHYSICAL EXAM:  GENERAL: NAD, well-developed  HEAD:  Atraumatic, Normocephalic  EYES: EOMI, PERRLA, conjunctiva and sclera clear  NECK: Supple, No JVD  CHEST/LUNG: Clear to auscultation bilaterally; No wheeze  HEART: Regular rate and rhythm; No murmurs, rubs, or gallops  ABDOMEN: Soft, Nontender, Nondistended; Bowel sounds present  EXTREMITIES:  2+ Peripheral Pulses, No clubbing, cyanosis, or edema  PSYCH: AAOx3  NEUROLOGY: non-focal  SKIN: No rashes or lesions    LABS:                        10.0   6.4   )-----------( 237      ( 29 Nov 2018 04:14 )             27.3     11-29    140  |  103  |  18  ----------------------------<  90  4.0   |  21<L>  |  1.09    Ca    9.1      29 Nov 2018 04:14    TPro  6.8  /  Alb  4.3  /  TBili  0.3  /  DBili  x   /  AST  11  /  ALT  10  /  AlkPhos  52  11-28    PT/INR - ( 28 Nov 2018 20:51 )   PT: 13.0 sec;   INR: 1.13 ratio         PTT - ( 28 Nov 2018 20:51 )  PTT:29.7 sec          RADIOLOGY & ADDITIONAL TESTS:    Imaging Personally Reviewed:    Consultant(s) Notes Reviewed:      Care Discussed with Consultants/Other Providers:

## 2018-11-29 NOTE — PROGRESS NOTE ADULT - PROBLEM SELECTOR PLAN 5
DVT ppx: SCD's in setting of GI bleed    Delmy Gleason M.D.   PGY-2 | Internal Medicine   [phone] | [id number] DVT ppx: SCD's in setting of GI bleed

## 2018-11-29 NOTE — H&P ADULT - ASSESSMENT
22 y/o Male with PMHx of asthma, GERD, Dieulafoy lesions (GI bleeding 3 years ago requiring 2 transfusions, EGD showed lesion in stomach, which was cauterized) presents with 1 week history of melena intermixed with BRB, found to have Hgb of 9.3, likely secondary to upper GI bleed.

## 2018-11-29 NOTE — H&P ADULT - NSHPLABSRESULTS_GEN_ALL_CORE
LABS AND IMAGING PERSONALLY REVIEWED:                            9.3    5.7   )-----------( 255      ( 28 Nov 2018 20:51 )             26.1       11-28    140  |  102  |  17  ----------------------------<  111<H>  3.4<L>   |  23  |  1.00    Ca    9.2      28 Nov 2018 20:51    TPro  6.8  /  Alb  4.3  /  TBili  0.3  /  DBili  x   /  AST  11  /  ALT  10  /  AlkPhos  52  11-28                  PT/INR - ( 28 Nov 2018 20:51 )   PT: 13.0 sec;   INR: 1.13 ratio         PTT - ( 28 Nov 2018 20:51 )  PTT:29.7 sec    Lipase: 17    < from: Xray Chest 2 Views PA/Lat (11.28.18 @ 21:29) >      INTERPRETATION:  Clear lungs.  Please follow-up official report.      < end of copied text > LABS AND IMAGING PERSONALLY REVIEWED:                            9.3    5.7   )-----------( 255      ( 28 Nov 2018 20:51 )             26.1       11-28    140  |  102  |  17  ----------------------------<  111<H>  3.4<L>   |  23  |  1.00    Ca    9.2      28 Nov 2018 20:51    TPro  6.8  /  Alb  4.3  /  TBili  0.3  /  DBili  x   /  AST  11  /  ALT  10  /  AlkPhos  52  11-28                  PT/INR - ( 28 Nov 2018 20:51 )   PT: 13.0 sec;   INR: 1.13 ratio         PTT - ( 28 Nov 2018 20:51 )  PTT:29.7 sec    Lipase: 17      FOBT: +    < from: Xray Chest 2 Views PA/Lat (11.28.18 @ 21:29) >      INTERPRETATION:  Clear lungs.  Please follow-up official report.      < end of copied text >

## 2018-11-29 NOTE — H&P ADULT - HISTORY OF PRESENT ILLNESS
Delmy Gleason M.D.   PGY-2 | Internal Medicine   [phone] | [id number]    24 y/o Male with PMHx of asthma, GERD, Dieulafoy lesions (GI bleeding 3 years ago requiring 2 transfusions, EGD showed lesion in stomach, which was cauterized) presents with 1 week history of rectal bleeding. Patient says that he first noticed the bleeding last Thursday, came to the ED, where he was found to have a Hgb of 12.1, and was discharged to follow up with outpatient GI. Patient says that the bleeding resolved, and then started again yesterday. Patient says that stool is loose and dark, with some intermixed bright red blood.  Patient says that he has had some pinching abdominal pain in the umbilical area associated with the bowel movements. Patient with some nausea, but no vomiting. Patient says that he feels slightly lightheaded after the bowel movements, currently without dizziness/lightheadedeness. Since last Thursday, has been feeling some PURVIS. Patient says that he had been on a PPI since his bleeding event 3 years ago, but was stopped a month ago, as his GI physician wanted to monitor him off the medication. Patient underwent EGD a couple of weeks ago, which showed the lesion that was cauterized in the past, without active bleeding. Patient denies fever, chills, chest pain, or urinary symptoms. 24 y/o Male with PMHx of asthma, GERD, Dieulafoy lesions (GI bleeding 3 years ago requiring 2 transfusions, EGD showed lesion in stomach, which was cauterized) presents with 1 week history of rectal bleeding. Patient says that he first noticed the bleeding last Thursday, came to the ED, where he was found to have a Hgb of 12.1, and was discharged to follow up with outpatient GI. Patient says that the bleeding resolved, and then started again yesterday. Patient says that stool is loose and dark, with some intermixed bright red blood.  Patient says that he has had some pinching abdominal pain in the umbilical area associated with the bowel movements. Patient with some nausea, but no vomiting. Patient says that he feels slightly lightheaded after the bowel movements, currently without dizziness/lightheadedeness. Since last Thursday, has been feeling some PURVIS. Patient says that he had been on a PPI since his bleeding event 3 years ago, but was stopped a month ago, as his GI physician wanted to monitor him off the medication. Patient underwent EGD a couple of weeks ago, which showed the lesion that was cauterized in the past, without active bleeding. Patient denies fever, chills, chest pain, or urinary symptoms.

## 2018-11-29 NOTE — H&P ADULT - NSHPSOCIALHISTORY_GEN_ALL_CORE
Patient lives at home with parents. Patient works in pharmacy. Patient denies smoking, alcohol use or drug use.

## 2018-11-29 NOTE — H&P ADULT - PROBLEM SELECTOR PLAN 5
DVT ppx: SCD's in setting of GI bleed DVT ppx: SCD's in setting of GI bleed    Delmy Gleason M.D.   PGY-2 | Internal Medicine   [phone] | [id number]

## 2018-11-29 NOTE — H&P ADULT - NSHPPHYSICALEXAM_GEN_ALL_CORE
Vital Signs Last 24 Hrs  T(C): 37.3 (28 Nov 2018 23:09), Max: 37.6 (28 Nov 2018 22:14)  T(F): 99.1 (28 Nov 2018 23:09), Max: 99.6 (28 Nov 2018 22:14)  HR: 92 (28 Nov 2018 23:09) (92 - 129)  BP: 120/74 (28 Nov 2018 23:09) (119/84 - 134/76)  BP(mean): --  RR: 18 (28 Nov 2018 23:09) (18 - 20)  SpO2: 100% (28 Nov 2018 23:09) (98% - 100%)    PHYSICAL EXAM  GENERAL: NAD, sitting up comfortably in bed   HEAD:  Atraumatic, Normocephalic  EYES: EOMI b/l, PERRLA b/l, conjunctiva and sclera clear  NECK: Supple, No JVD, No LAD   CHEST/LUNG: Clear to auscultation bilaterally; No wheeze or ronchi  HEART: Regular rate and rhythm; S1 and S2 present, No murmurs, rubs, or gallops  ABDOMEN: Soft, mild tenderness in umbilical area, Nondistended; Bowel sounds present  EXTREMITIES:  2+ Peripheral Pulses, No clubbing, cyanosis, or edema  NEURO: AAOx3, non-focal   SKIN: No rashes or lesions  RECTAL: patient refused rectal exam

## 2018-11-29 NOTE — PROGRESS NOTE ADULT - PROBLEM SELECTOR PLAN 1
Patient presenting with 1 week history of intermittent melena with BRBPR/dark stools, found to have Hgb of 9.3, had been 12.1 on 11/23  - FOBT +  - Currently hemodynamically stable, but had been tachycardic, breathing comfortably on RA  - recently taken off of chronic PPI by outpatient GI  - likely secondary to UGIB, ulcer vs Dieulafoy lesion (although negative EGD a couple of weeks ago, may have manipulated old lesion)  - will consult GI for possible EGD, keep NPO  - will transfuse 1 unit of pRBC's and follow up CBC afterwards if another unit is necessary   - s/p Protonix 40 IV in ED, will start PPI drip, and then transition to Protonix 40 IV BID   - will continue to monitor vitals q 4, will check set of orthostatics   - active type and screen Patient presenting with 1 week history of intermittent melena with BRBPR/dark stools, found to have Hgb of 9.3, had been 12.1 on 11/23  - FOBT +  - Currently hemodynamically stable, but had been tachycardic, breathing comfortably on RA  - recently taken off of chronic PPI by outpatient GI  - likely secondary to UGIB, ulcer vs Dieulafoy lesion (although negative EGD a couple of weeks ago, may have manipulated old lesion)  - consulted GI for possible EGD, keep NPO  - will transfuse 1 unit of pRBC's and follow up CBC afterwards if another unit is necessary   - s/p Protonix 40 IV in ED, will start PPI drip, and then transition to Protonix 40 IV BID   - will continue to monitor vitals q 4, will check set of orthostatics   - active type and screen

## 2018-11-30 ENCOUNTER — TRANSCRIPTION ENCOUNTER (OUTPATIENT)
Age: 23
End: 2018-11-30

## 2018-11-30 LAB
ANION GAP SERPL CALC-SCNC: 17 MMOL/L — SIGNIFICANT CHANGE UP (ref 5–17)
BUN SERPL-MCNC: 16 MG/DL — SIGNIFICANT CHANGE UP (ref 7–23)
CALCIUM SERPL-MCNC: 9.5 MG/DL — SIGNIFICANT CHANGE UP (ref 8.4–10.5)
CHLORIDE SERPL-SCNC: 102 MMOL/L — SIGNIFICANT CHANGE UP (ref 96–108)
CO2 SERPL-SCNC: 20 MMOL/L — LOW (ref 22–31)
CREAT SERPL-MCNC: 1.14 MG/DL — SIGNIFICANT CHANGE UP (ref 0.5–1.3)
GLUCOSE SERPL-MCNC: 88 MG/DL — SIGNIFICANT CHANGE UP (ref 70–99)
HCT VFR BLD CALC: 26.3 % — LOW (ref 39–50)
HCT VFR BLD CALC: 29.9 % — LOW (ref 39–50)
HGB BLD-MCNC: 10.6 G/DL — LOW (ref 13–17)
HGB BLD-MCNC: 9.5 G/DL — LOW (ref 13–17)
MAGNESIUM SERPL-MCNC: 1.8 MG/DL — SIGNIFICANT CHANGE UP (ref 1.6–2.6)
MCHC RBC-ENTMCNC: 28.1 PG — SIGNIFICANT CHANGE UP (ref 27–34)
MCHC RBC-ENTMCNC: 28.4 PG — SIGNIFICANT CHANGE UP (ref 27–34)
MCHC RBC-ENTMCNC: 35.4 GM/DL — SIGNIFICANT CHANGE UP (ref 32–36)
MCHC RBC-ENTMCNC: 36 GM/DL — SIGNIFICANT CHANGE UP (ref 32–36)
MCV RBC AUTO: 79 FL — LOW (ref 80–100)
MCV RBC AUTO: 79.4 FL — LOW (ref 80–100)
PHOSPHATE SERPL-MCNC: 3 MG/DL — SIGNIFICANT CHANGE UP (ref 2.5–4.5)
PLATELET # BLD AUTO: 234 K/UL — SIGNIFICANT CHANGE UP (ref 150–400)
PLATELET # BLD AUTO: 264 K/UL — SIGNIFICANT CHANGE UP (ref 150–400)
POTASSIUM SERPL-MCNC: 3.9 MMOL/L — SIGNIFICANT CHANGE UP (ref 3.5–5.3)
POTASSIUM SERPL-SCNC: 3.9 MMOL/L — SIGNIFICANT CHANGE UP (ref 3.5–5.3)
RBC # BLD: 3.33 M/UL — LOW (ref 4.2–5.8)
RBC # BLD: 3.77 M/UL — LOW (ref 4.2–5.8)
RBC # FLD: 12.2 % — SIGNIFICANT CHANGE UP (ref 10.3–14.5)
RBC # FLD: 12.8 % — SIGNIFICANT CHANGE UP (ref 10.3–14.5)
SODIUM SERPL-SCNC: 139 MMOL/L — SIGNIFICANT CHANGE UP (ref 135–145)
WBC # BLD: 7.4 K/UL — SIGNIFICANT CHANGE UP (ref 3.8–10.5)
WBC # BLD: 7.6 K/UL — SIGNIFICANT CHANGE UP (ref 3.8–10.5)
WBC # FLD AUTO: 7.4 K/UL — SIGNIFICANT CHANGE UP (ref 3.8–10.5)
WBC # FLD AUTO: 7.6 K/UL — SIGNIFICANT CHANGE UP (ref 3.8–10.5)

## 2018-11-30 PROCEDURE — 99233 SBSQ HOSP IP/OBS HIGH 50: CPT | Mod: GC

## 2018-11-30 PROCEDURE — 43235 EGD DIAGNOSTIC BRUSH WASH: CPT | Mod: GC

## 2018-11-30 RX ORDER — PANTOPRAZOLE SODIUM 20 MG/1
40 TABLET, DELAYED RELEASE ORAL
Qty: 0 | Refills: 0 | Status: DISCONTINUED | OUTPATIENT
Start: 2018-11-30 | End: 2018-12-01

## 2018-11-30 RX ORDER — PANTOPRAZOLE SODIUM 20 MG/1
1 TABLET, DELAYED RELEASE ORAL
Qty: 30 | Refills: 0 | OUTPATIENT
Start: 2018-11-30 | End: 2018-12-29

## 2018-11-30 RX ORDER — ACETAMINOPHEN 500 MG
650 TABLET ORAL ONCE
Qty: 0 | Refills: 0 | Status: COMPLETED | OUTPATIENT
Start: 2018-11-30 | End: 2018-11-30

## 2018-11-30 RX ADMIN — PANTOPRAZOLE SODIUM 10 MG/HR: 20 TABLET, DELAYED RELEASE ORAL at 01:10

## 2018-11-30 RX ADMIN — Medication 650 MILLIGRAM(S): at 16:45

## 2018-11-30 RX ADMIN — Medication 650 MILLIGRAM(S): at 17:15

## 2018-11-30 RX ADMIN — PANTOPRAZOLE SODIUM 10 MG/HR: 20 TABLET, DELAYED RELEASE ORAL at 16:45

## 2018-11-30 NOTE — PROGRESS NOTE ADULT - PROBLEM SELECTOR PLAN 1
Patient presenting with 1 week history of intermittent melena with BRBPR/dark stools, found to have Hgb of 9.3, had been 12.1 on 11/23  - FOBT +  - Currently hemodynamically stable, but had been tachycardic, breathing comfortably on RA  - recently taken off of chronic PPI by outpatient GI  - likely secondary to UGIB, ulcer vs Dieulafoy lesion (although negative EGD a couple of weeks ago, may have manipulated old lesion)  - consulted GI for possible EGD, keep NPO  - will transfuse 1 unit of pRBC's and follow up CBC afterwards if another unit is necessary   - s/p Protonix 40 IV in ED, will start PPI drip, and then transition to Protonix 40 IV BID   - will continue to monitor vitals q 4, will check set of orthostatics   - active type and screen Patient presenting with 1 week history of intermittent melena with BRBPR/dark stools, hgb now stable  - Currently hemodynamically stable, comfortably on RA  - likely secondary to UGIB, ulcer vs Dieulafoy lesion (although negative EGD a couple of weeks ago, may have manipulated old lesion)  - s/p Protonix 40 IV in ED, c/w protonix drip  - will continue to monitor vitals q 4, will check set of orthostatics   - active type and screen  - q12 cbc  - EGD today

## 2018-11-30 NOTE — DISCHARGE NOTE ADULT - CARE PLAN
Principal Discharge DX:	Anemia  Goal:	Cessation of GI bleed  Assessment and plan of treatment:	In the hospital you were found to be anemic from a GI bleed. You were given medication and underwent an EGD to treat this bleeding. Continue taking pantoprazole as prescribed upon discharge. Return to the hospital immediately if you experience a recurrence of bleeding from the rectum or see black, tarry, or dark stools. Principal Discharge DX:	Anemia  Goal:	Cessation of GI bleed  Assessment and plan of treatment:	In the hospital you were found to be anemic from a GI bleed. You were given medication and underwent an EGD to treat this bleeding. Continue taking pantoprazole as prescribed upon discharge. Return to the hospital immediately if you experience a recurrence of bleeding from the rectum or see black, tarry, or dark stools.  Please follow up with your gastroenterologist Dr. Larose in one week for a repeat complete blood count.

## 2018-11-30 NOTE — DISCHARGE NOTE ADULT - ADDITIONAL INSTRUCTIONS
- Continue taking pantoprazole daily as prescribed.  - You are to follow-up with your gastroenterologist within one week of discharge.  - Return to the hospital immediately if you experience bleeding per rectum or dark stools.

## 2018-11-30 NOTE — PROGRESS NOTE ADULT - SUBJECTIVE AND OBJECTIVE BOX
Alexey Chau MD PGY1  [phone]  Medicine Team 4      Patient is a 23y old  Male who presents with a chief complaint of GI bleeding (29 Nov 2018 08:40)      SUBJECTIVE / OVERNIGHT EVENTS:    MEDICATIONS  (STANDING):  pantoprazole Infusion 8 mG/Hr (10 mL/Hr) IV Continuous <Continuous>    MEDICATIONS  (PRN):      Vital Signs Last 24 Hrs  T(C): 37.1 (30 Nov 2018 05:14), Max: 37.6 (29 Nov 2018 14:11)  T(F): 98.8 (30 Nov 2018 05:14), Max: 99.6 (29 Nov 2018 14:11)  HR: 83 (30 Nov 2018 05:14) (75 - 83)  BP: 120/80 (30 Nov 2018 05:14) (114/64 - 138/75)  BP(mean): --  RR: 18 (30 Nov 2018 05:14) (18 - 18)  SpO2: 98% (30 Nov 2018 05:14) (97% - 99%)  CAPILLARY BLOOD GLUCOSE        I&O's Summary    29 Nov 2018 07:01  -  30 Nov 2018 07:00  --------------------------------------------------------  IN: 360 mL / OUT: 0 mL / NET: 360 mL        PHYSICAL EXAM:  GENERAL: NAD, well-developed  HEAD:  Atraumatic, Normocephalic  EYES: EOMI, PERRLA, conjunctiva and sclera clear  NECK: Supple, No JVD  CHEST/LUNG: Clear to auscultation bilaterally; No wheeze  HEART: Regular rate and rhythm; No murmurs, rubs, or gallops  ABDOMEN: Soft, Nontender, Nondistended; Bowel sounds present  EXTREMITIES:  2+ Peripheral Pulses, No clubbing, cyanosis, or edema  PSYCH: AAOx3  NEUROLOGY: non-focal  SKIN: No rashes or lesions    LABS:                        10.6   7.6   )-----------( 264      ( 30 Nov 2018 00:51 )             29.9     11-30    139  |  102  |  16  ----------------------------<  88  3.9   |  20<L>  |  1.14    Ca    9.5      30 Nov 2018 06:07  Phos  3.0     11-30  Mg     1.8     11-30    TPro  6.8  /  Alb  4.3  /  TBili  0.3  /  DBili  x   /  AST  11  /  ALT  10  /  AlkPhos  52  11-28    PT/INR - ( 28 Nov 2018 20:51 )   PT: 13.0 sec;   INR: 1.13 ratio         PTT - ( 29 Nov 2018 09:10 )  PTT:31.0 sec          RADIOLOGY & ADDITIONAL TESTS:    Imaging Personally Reviewed:    Consultant(s) Notes Reviewed:      Care Discussed with Consultants/Other Providers: Alexey Chau MD PGY1  [phone]  Medicine Team 4      Patient is a 23y old  Male who presents with a chief complaint of GI bleeding (29 Nov 2018 08:40)      SUBJECTIVE / OVERNIGHT EVENTS:  Pt has complaint of b/l arm tingling and now L arm pain. Pt tolerable with heat and cold packs. Pt denies abd pain. No BM yesterday. Pt has been NPO since midnight.   MEDICATIONS  (STANDING):  pantoprazole Infusion 8 mG/Hr (10 mL/Hr) IV Continuous <Continuous>    MEDICATIONS  (PRN):      Vital Signs Last 24 Hrs  T(C): 37.1 (30 Nov 2018 05:14), Max: 37.6 (29 Nov 2018 14:11)  T(F): 98.8 (30 Nov 2018 05:14), Max: 99.6 (29 Nov 2018 14:11)  HR: 83 (30 Nov 2018 05:14) (75 - 83)  BP: 120/80 (30 Nov 2018 05:14) (114/64 - 138/75)  BP(mean): --  RR: 18 (30 Nov 2018 05:14) (18 - 18)  SpO2: 98% (30 Nov 2018 05:14) (97% - 99%)  CAPILLARY BLOOD GLUCOSE        I&O's Summary    29 Nov 2018 07:01  -  30 Nov 2018 07:00  --------------------------------------------------------  IN: 360 mL / OUT: 0 mL / NET: 360 mL        PHYSICAL EXAM:  GENERAL: NAD, well-developed  HEAD:  Atraumatic, Normocephalic  EYES: EOMI, PERRLA, conjunctiva and sclera clear  NECK: Supple, No JVD  CHEST/LUNG: Clear to auscultation bilaterally; No wheeze  HEART: Regular rate and rhythm; No murmurs, rubs, or gallops  ABDOMEN: Soft, Nontender, Nondistended; Bowel sounds present  EXTREMITIES:  tenderness on left medial surface of forearm. No ecchymosis or overt signs of trauma  PSYCH: AAOx3  NEUROLOGY: non-focal  SKIN: No rashes or lesions    LABS:                        10.6   7.6   )-----------( 264      ( 30 Nov 2018 00:51 )             29.9     11-30    139  |  102  |  16  ----------------------------<  88  3.9   |  20<L>  |  1.14    Ca    9.5      30 Nov 2018 06:07  Phos  3.0     11-30  Mg     1.8     11-30    TPro  6.8  /  Alb  4.3  /  TBili  0.3  /  DBili  x   /  AST  11  /  ALT  10  /  AlkPhos  52  11-28    PT/INR - ( 28 Nov 2018 20:51 )   PT: 13.0 sec;   INR: 1.13 ratio         PTT - ( 29 Nov 2018 09:10 )  PTT:31.0 sec          RADIOLOGY & ADDITIONAL TESTS:    Imaging Personally Reviewed:    Consultant(s) Notes Reviewed:      Care Discussed with Consultants/Other Providers:

## 2018-11-30 NOTE — PROVIDER CONTACT NOTE (OTHER) - ASSESSMENT
Pt A&Ox4. Pt VS as charted. pt on IV protonix drip 2 100 cc/hr. Pt c/o tingling and numbness in b/l arms. Heat packs applied to b/l arms.

## 2018-11-30 NOTE — PROGRESS NOTE ADULT - SUBJECTIVE AND OBJECTIVE BOX
Pre-Endoscopy Evaluation      Referring Physician: dr. meena chris                                  Procedure:  upper gastrointestinal endoscopy     Indication for Procedure: gib    Pertinent History: 23y male with PMHx of asthma, GERD, Dieulafoy lesions (GI bleeding 3 years ago requiring 2 transfusions, EGD w/ cauterized) p/w 1 week history of melena and BRBPR    Sedation by Anesthesia [X]    PAST MEDICAL & SURGICAL HISTORY:  GERD (gastroesophageal reflux disease)  Dieulafoy lesion of stomach  Asthma  Ureteral disorder      PMH of Gastroparesis [ ]  Gastric Surgery [ ]  Gastric Outlet Obstruction [ ]    Allergies    No Known Allergies    Intolerances    Latex allergy: [ ] yes [X] no    Medications:MEDICATIONS  (STANDING):  pantoprazole Infusion 8 mG/Hr (10 mL/Hr) IV Continuous <Continuous>    MEDICATIONS  (PRN):      Smoking: [ ] yes  [X] no    AICD/PPM: [ ] yes   [X] no    Pertinent lab data:                        10.6   7.6   )-----------( 264      ( 30 Nov 2018 00:51 )             29.9     11-30    139  |  102  |  16  ----------------------------<  88  3.9   |  20<L>  |  1.14    Ca    9.5      30 Nov 2018 06:07  Phos  3.0     11-30  Mg     1.8     11-30    TPro  6.8  /  Alb  4.3  /  TBili  0.3  /  DBili  x   /  AST  11  /  ALT  10  /  AlkPhos  52  11-28    PT/INR - ( 28 Nov 2018 20:51 )   PT: 13.0 sec;   INR: 1.13 ratio       PTT - ( 29 Nov 2018 09:10 )  PTT:31.0 sec        Physical Examination:  Daily   Vital Signs Last 24 Hrs  T(C): 37.1 (30 Nov 2018 05:14), Max: 37.6 (29 Nov 2018 14:11)  T(F): 98.8 (30 Nov 2018 05:14), Max: 99.6 (29 Nov 2018 14:11)  HR: 83 (30 Nov 2018 05:14) (75 - 83)  BP: 120/80 (30 Nov 2018 05:14) (114/64 - 138/75)  BP(mean): --  RR: 18 (30 Nov 2018 05:14) (18 - 18)  SpO2: 98% (30 Nov 2018 05:14) (97% - 99%)    Drug Dosing Weight  Height (cm): 175.26 (28 Nov 2018 23:09)  Weight (kg): 74.4 (28 Nov 2018 23:09)  BMI (kg/m2): 24.2 (28 Nov 2018 23:09)  BSA (m2): 1.9 (28 Nov 2018 23:09)    Constitutional: NAD     Neck:  No JVD    Respiratory: CTAB/L    Cardiovascular: S1 and S2    Gastrointestinal: BS+, soft, NT/ND    Extremities: No peripheral edema    Neurological: A/O x 3    : No Zhang    Skin: No rashes    Comments:    ASA Class: I [ ]  II [x]  III [ ]  IV [ ]    The patient is a suitable candidate for the planned procedure unless box checked [ ]  No, explain:

## 2018-11-30 NOTE — DISCHARGE NOTE ADULT - PATIENT PORTAL LINK FT
You can access the [facility] Portal, offered by [facility], by registering with the following website: [email or web address]

## 2018-11-30 NOTE — DISCHARGE NOTE ADULT - HOSPITAL COURSE
22 y/o Male with PMHx of asthma, GERD, Dieulafoy lesions (GI bleeding 3 years ago requiring 2 transfusions, EGD showed lesion in stomach, which was cauterized) presents with 1 week history of rectal bleeding. Patient says that he first noticed the bleeding last Thursday, came to the ED, where he was found to have a Hgb of 12.1, and was discharged to follow up with outpatient GI. Patient says that the bleeding resolved, and then started again the day PTA. Patient says that stool is loose and dark, with some intermixed bright red blood. Patient says that he had been on a PPI since his bleeding event 3 years ago, but was stopped a month ago, as his GI physician wanted to monitor him off the medication. Patient underwent EGD a couple of weeks ago, which showed the lesion that was cauterized in the past, without active bleeding.    Pt was admitted and started on IV ppi and kept NPO. He underwent an EGD that showed no bleeding/abnormalities. His hemoglobin and hematocrit remained stable throughout hospitalization. He did not require a blood transfusion. His diet was advanced to regular and he was determined to be stable for discharge with outpatient follow-up. 22 y/o Male with PMHx of asthma, GERD, Dieulafoy lesions (GI bleeding 3 years ago requiring 2 transfusions, EGD showed lesion in stomach, which was cauterized) presents with 1 week history of rectal bleeding. Patient says that he first noticed the bleeding last Thursday, came to the ED, where he was found to have a Hgb of 12.1, and was discharged to follow up with outpatient GI. Patient says that the bleeding resolved, and then started again the day PTA. Patient says that stool is loose and dark, with some intermixed bright red blood. Patient says that he had been on a PPI since his bleeding event 3 years ago, but was stopped a month ago, as his GI physician wanted to monitor him off the medication. Patient underwent EGD a couple of weeks ago, which showed the lesion that was cauterized in the past, without active bleeding.    Pt was admitted and started on IV ppi and kept NPO. He underwent an EGD that showed no bleeding/abnormalities. His hemoglobin and hematocrit remained stable throughout hospitalization. He did not require a blood transfusion. His diet was advanced to regular and he was determined to be stable for discharge with outpatient follow-up. EGD was negative for any bleeding. 24 y/o Male with PMHx of asthma, GERD, Dieulafoy lesions (GI bleeding 3 years ago requiring 2 transfusions, EGD showed lesion in stomach, which was cauterized) presents with 1 week history of rectal bleeding. Patient says that he first noticed the bleeding last Thursday, came to the ED, where he was found to have a Hgb of 12.1, and was discharged to follow up with outpatient GI. Patient says that the bleeding resolved, and then started again the day PTA. Patient says that stool is loose and dark, with some intermixed bright red blood. Patient says that he had been on a PPI since his bleeding event 3 years ago, but was stopped a month ago, as his GI physician wanted to monitor him off the medication.   Patient underwent EGD a couple of weeks ago, which showed the lesion that was cauterized in the past, without active bleeding.    Pt was admitted and started on IV ppi and kept NPO. He underwent an EGD that showed no bleeding/abnormalities. His hemoglobin and hematocrit remained stable throughout hospitalization. He did not require a blood transfusion. His diet was advanced to regular and he was determined to be stable for discharge with outpatient follow-up. EGD was negative for any bleeding.

## 2018-11-30 NOTE — DISCHARGE NOTE ADULT - OTHER SIGNIFICANT FINDINGS
< from: Upper Endoscopy (11.30.18 @ 14:18) >                                                Impression:          - Normal esophagus.                       - Normal stomach.                       - Normal examined duodenum.                       - No specimens collected.    < end of copied text >

## 2018-11-30 NOTE — PROGRESS NOTE ADULT - PROBLEM SELECTOR PLAN 4
Patient with stable intermittent asthma, has not used Ventolin PRN in a long time  - currently without any symptoms DVT ppx: Pt ambulating, improve score 0

## 2018-11-30 NOTE — DISCHARGE NOTE ADULT - PROVIDER TOKENS
FREE:[LAST:[Rand],FIRST:[Pedro],PHONE:[phone]],FAX:[(   [phone]    -],ADDRESS:[[address] [place] [address]]]

## 2018-11-30 NOTE — DISCHARGE NOTE ADULT - PLAN OF CARE
Cessation of GI bleed In the hospital you were found to be anemic from a GI bleed. You were given medication and underwent an EGD to treat this bleeding. Continue taking pantoprazole as prescribed upon discharge. Return to the hospital immediately if you experience a recurrence of bleeding from the rectum or see black, tarry, or dark stools. In the hospital you were found to be anemic from a GI bleed. You were given medication and underwent an EGD to treat this bleeding. Continue taking pantoprazole as prescribed upon discharge. Return to the hospital immediately if you experience a recurrence of bleeding from the rectum or see black, tarry, or dark stools.  Please follow up with your gastroenterologist Dr. Larose in one week for a repeat complete blood count.

## 2018-11-30 NOTE — PROGRESS NOTE ADULT - PROBLEM SELECTOR PLAN 2
Hgb 9.3, likely secondary to GI bleed  - treatment as above Hgb stable, likely secondary to GI bleed  - treatment as above

## 2018-11-30 NOTE — PROGRESS NOTE ADULT - PROBLEM SELECTOR PLAN 3
Patient with history of Dieulafoy lesion 3 years which cause acute blood loss anemia requiring 2 units of pRBC's  - Plan as above for current GI bleed Patient with stable intermittent asthma, has not used Ventolin PRN in a long time  - currently without any symptoms

## 2018-11-30 NOTE — DISCHARGE NOTE ADULT - MEDICATION SUMMARY - MEDICATIONS TO TAKE
I will START or STAY ON the medications listed below when I get home from the hospital:    Ventolin 90 mcg/inh inhalation aerosol with adapter  -- inhaled every 4 hours, As Needed  -- Indication: For Asthma    pantoprazole 40 mg oral delayed release tablet  -- 1 tab(s) by mouth once a day (before a meal)  -- Indication: For Rectal bleed

## 2018-12-01 VITALS
HEART RATE: 71 BPM | RESPIRATION RATE: 18 BRPM | SYSTOLIC BLOOD PRESSURE: 115 MMHG | TEMPERATURE: 98 F | DIASTOLIC BLOOD PRESSURE: 51 MMHG | OXYGEN SATURATION: 99 %

## 2018-12-01 LAB
HCT VFR BLD CALC: 26.6 % — LOW (ref 39–50)
HGB BLD-MCNC: 9 G/DL — LOW (ref 13–17)
MCHC RBC-ENTMCNC: 27.4 PG — SIGNIFICANT CHANGE UP (ref 27–34)
MCHC RBC-ENTMCNC: 33.8 GM/DL — SIGNIFICANT CHANGE UP (ref 32–36)
MCV RBC AUTO: 80.9 FL — SIGNIFICANT CHANGE UP (ref 80–100)
PLATELET # BLD AUTO: 228 K/UL — SIGNIFICANT CHANGE UP (ref 150–400)
RBC # BLD: 3.29 M/UL — LOW (ref 4.2–5.8)
RBC # FLD: 13.7 % — SIGNIFICANT CHANGE UP (ref 10.3–14.5)
WBC # BLD: 5.67 K/UL — SIGNIFICANT CHANGE UP (ref 3.8–10.5)
WBC # FLD AUTO: 5.67 K/UL — SIGNIFICANT CHANGE UP (ref 3.8–10.5)

## 2018-12-01 PROCEDURE — 99239 HOSP IP/OBS DSCHRG MGMT >30: CPT

## 2018-12-01 RX ORDER — PANTOPRAZOLE SODIUM 20 MG/1
1 TABLET, DELAYED RELEASE ORAL
Qty: 30 | Refills: 0 | OUTPATIENT
Start: 2018-12-01 | End: 2018-12-30

## 2018-12-01 RX ADMIN — PANTOPRAZOLE SODIUM 40 MILLIGRAM(S): 20 TABLET, DELAYED RELEASE ORAL at 05:08

## 2018-12-01 NOTE — PROGRESS NOTE ADULT - ASSESSMENT
Impression:  1) Melena/hematochezia   s/p EGD yesterday which was unremarkable.  DDx: angioectasia vs. PUD (distal to the duodenum) vs. dieulofoy's lesion vs. lower GI bleed (more likely right side)    - monitor for additional bloody BMs  - trend CBC q8h  - clear liquids for now Impression:  1) Melena/hematochezia   s/p EGD yesterday which was unremarkable.  DDx: angioectasia vs. PUD (distal to the duodenum) vs. dieulofoy's lesion vs. lower GI bleed (more likely right side)    - monitor for additional bloody BMs  - trend CBC q12h  - can advance diet as tolerated  - can discharge patient as per primary team if no additional episodes of melena or BRBpR  - patient can follow up with his outside GI Dr. Larose for further workup, he may need video capsule as outpatient

## 2018-12-01 NOTE — PROGRESS NOTE ADULT - SUBJECTIVE AND OBJECTIVE BOX
Chief Complaint:  Patient is a 23y old  Male who presents with a chief complaint of GI bleeding (30 Nov 2018 11:13)      Interval Events:   Hb of 9.5 this morning. Vitals stable.  EGD done yesterday unremarkable, no evidence of bleeding.    Allergies:  No Known Allergies      Home Medications:    Hospital Medications:  pantoprazole    Tablet 40 milliGRAM(s) Oral before breakfast      PMHX/PSHX:  GERD (gastroesophageal reflux disease)  Dieulafoy lesion of stomach  Asthma  Ureteral disorder      Family history:  Family history of acute myocardial infarction (Uncle)  Family history of diabetes mellitus (Grandparent)      ROS:     as above      PHYSICAL EXAM:   Vital Signs:  Vital Signs Last 24 Hrs  T(C): 36.9 (01 Dec 2018 05:04), Max: 37.1 (30 Nov 2018 13:12)  T(F): 98.4 (01 Dec 2018 05:04), Max: 98.8 (30 Nov 2018 21:05)  HR: 71 (01 Dec 2018 05:04) (71 - 92)  BP: 115/51 (01 Dec 2018 05:04) (100/63 - 123/75)  BP(mean): --  RR: 18 (01 Dec 2018 05:04) (18 - 18)  SpO2: 99% (01 Dec 2018 05:04) (97% - 99%)  Daily     Daily     GENERAL:  nad  CHEST:  cta bl  HEART: rrr  ABDOMEN:  Soft, non-tender, non-distended, normoactive bowel sounds,  no masses , no hepatosplenomegaly  EXTREMITIES:  no cyanosis,clubbing or edema  SKIN:  No rash/erythema/ecchymoses/petechiae/wounds/abscess/warm/dry  NEURO:  Alert, oriented      LABS:                        9.5    7.4   )-----------( 234      ( 30 Nov 2018 17:42 )             26.3     11-30    139  |  102  |  16  ----------------------------<  88  3.9   |  20<L>  |  1.14    Ca    9.5      30 Nov 2018 06:07  Phos  3.0     11-30  Mg     1.8     11-30        PTT - ( 29 Nov 2018 09:10 )  PTT:31.0 sec        Imaging: Chief Complaint:  Patient is a 23y old  Male who presents with a chief complaint of GI bleeding (30 Nov 2018 11:13)      Interval Events:   Hb of 9.5 this morning. Vitals stable.  EGD done yesterday unremarkable, no evidence of bleeding.  Patient reports no melena since Wednesday. No complaints this AM.    Allergies:  No Known Allergies      Home Medications:    Hospital Medications:  pantoprazole    Tablet 40 milliGRAM(s) Oral before breakfast      PMHX/PSHX:  GERD (gastroesophageal reflux disease)  Dieulafoy lesion of stomach  Asthma  Ureteral disorder      Family history:  Family history of acute myocardial infarction (Uncle)  Family history of diabetes mellitus (Grandparent)      ROS:     as above      PHYSICAL EXAM:   Vital Signs:  Vital Signs Last 24 Hrs  T(C): 36.9 (01 Dec 2018 05:04), Max: 37.1 (30 Nov 2018 13:12)  T(F): 98.4 (01 Dec 2018 05:04), Max: 98.8 (30 Nov 2018 21:05)  HR: 71 (01 Dec 2018 05:04) (71 - 92)  BP: 115/51 (01 Dec 2018 05:04) (100/63 - 123/75)  BP(mean): --  RR: 18 (01 Dec 2018 05:04) (18 - 18)  SpO2: 99% (01 Dec 2018 05:04) (97% - 99%)  Daily     Daily     GENERAL:  nad  CHEST:  cta bl  HEART: rrr  ABDOMEN:  Soft, non-tender, non-distended, normoactive bowel sounds,  no masses , no hepatosplenomegaly  EXTREMITIES:  no cyanosis,clubbing or edema  SKIN:  No rash/erythema/ecchymoses/petechiae/wounds/abscess/warm/dry  NEURO:  Alert, oriented      LABS:                        9.5    7.4   )-----------( 234      ( 30 Nov 2018 17:42 )             26.3     11-30    139  |  102  |  16  ----------------------------<  88  3.9   |  20<L>  |  1.14    Ca    9.5      30 Nov 2018 06:07  Phos  3.0     11-30  Mg     1.8     11-30        PTT - ( 29 Nov 2018 09:10 )  PTT:31.0 sec        Imaging:

## 2018-12-01 NOTE — PROGRESS NOTE ADULT - SUBJECTIVE AND OBJECTIVE BOX
***************************************************************  Dr. Sterling Alexander, PGY3  Internal Medicine   [facility] Pager: [phone]  [facility] Pager: [phone]  ***************************************************************    VERO MENDENHALL  23y  MRN: [id number]    Subjective:    Patient is a 23y old  Male who presents with a chief complaint of GI bleeding (01 Dec 2018 08:05)      HPI:      MEDICATIONS  (STANDING):  pantoprazole    Tablet 40 milliGRAM(s) Oral before breakfast    MEDICATIONS  (PRN):        Objective:    Vitals: Vital Signs Last 24 Hrs  T(C): 36.9 (12-01-18 @ 05:04), Max: 37.1 (11-30-18 @ 13:12)  T(F): 98.4 (12-01-18 @ 05:04), Max: 98.8 (11-30-18 @ 21:05)  HR: 71 (12-01-18 @ 05:04) (71 - 92)  BP: 115/51 (12-01-18 @ 05:04) (100/63 - 123/75)  BP(mean): --  RR: 18 (12-01-18 @ 05:04) (18 - 18)  SpO2: 99% (12-01-18 @ 05:04) (97% - 99%)            I&O's Summary    30 Nov 2018 07:01  -  01 Dec 2018 07:00  --------------------------------------------------------  IN: 480 mL / OUT: 0 mL / NET: 480 mL        PHYSICAL EXAM:  GENERAL: NAD  HEAD:  Atraumatic, Normocephalic  EYES: EOMI, PERRLA, conjunctiva and sclera clear  CHEST/LUNG: Clear to percussion bilaterally; No rales, rhonchi, wheezing, or rubs  HEART: Regular rate and rhythm; No murmurs, rubs, or gallops  ABDOMEN: Soft, Nontender, Nondistended; Bowel sounds present  SKIN: No rashes or lesions  NERVOUS SYSTEM:  Alert & Oriented X3, Good concentration; Motor Strength 5/5 B/L upper and lower extremities                                           LABS:  11-30    139  |  102  |  16  ----------------------------<  88  3.9   |  20<L>  |  1.14  11-29    140  |  103  |  18  ----------------------------<  90  4.0   |  21<L>  |  1.09  11-28    140  |  102  |  17  ----------------------------<  111<H>  3.4<L>   |  23  |  1.00    Ca    9.5      30 Nov 2018 06:07  Ca    9.1      29 Nov 2018 04:14  Ca    9.2      28 Nov 2018 20:51  Phos  3.0     11-30  Mg     1.8     11-30    TPro  6.8  /  Alb  4.3  /  TBili  0.3  /  DBili  x   /  AST  11  /  ALT  10  /  AlkPhos  52  11-28      PTT - ( 29 Nov 2018 09:10 )  PTT:31.0 sec                                        9.5    7.4   )-----------( 234      ( 30 Nov 2018 17:42 )             26.3                         10.6   7.6   )-----------( 264      ( 30 Nov 2018 00:51 )             29.9                         11.1   6.0   )-----------( 249      ( 29 Nov 2018 13:29 )             31.1     CAPILLARY BLOOD GLUCOSE          RADIOLOGY & ADDITIONAL TESTS:    Imaging Personally Reviewed:  [x ] YES  [ ] NO    < from: Upper Endoscopy (11.30.18 @ 14:18) >  Impression:          - Normal esophagus.                       - Normal stomach.                       - Normal examined duodenum.                       - No specimens collected.    < end of copied text >        Consultants involved in case:   Consultant(s) Notes Reviewed:  [ ] YES  [ ] NO:   Care Discussed with Consultants/Other Providers [ ] YES  [ ] NO

## 2018-12-01 NOTE — PROGRESS NOTE ADULT - ATTENDING COMMENTS
Patient seen and examined with GI fellow. I agree with above. Patient not actively bleeding as no current clinical signs of GI bleeding (no melena or hematemesis). His vitals are stable. The mild Hb drop is likely equilibration. Plan for outpatient workup with capsule.
DC planning took 30 min
Patient scheduled for EGD today.  F/U H&H.

## 2018-12-01 NOTE — PROGRESS NOTE ADULT - PROBLEM SELECTOR PLAN 1
Patient presenting with 1 week history of intermittent melena with BRBPR/dark stools, hgb now stable  - Currently hemodynamically stable, comfortably on RA  - likely secondary to UGIB, ulcer vs Dieulafoy lesion (although negative EGD a couple of weeks ago, may have manipulated old lesion)  - on PO Protonix 40mg qd   - EGD on 11/30: Normal findings

## 2018-12-14 LAB
BASOPHILS # BLD AUTO: 0.04 K/UL
BASOPHILS NFR BLD AUTO: 0.7 %
EOSINOPHIL # BLD AUTO: 0.08 K/UL
EOSINOPHIL NFR BLD AUTO: 1.5 %
HCT VFR BLD CALC: 31.6 %
HGB BLD-MCNC: 10.5 G/DL
IMM GRANULOCYTES NFR BLD AUTO: 0.2 %
LYMPHOCYTES # BLD AUTO: 1.7 K/UL
LYMPHOCYTES NFR BLD AUTO: 31 %
MAN DIFF?: NORMAL
MCHC RBC-ENTMCNC: 26.2 PG
MCHC RBC-ENTMCNC: 33.2 GM/DL
MCV RBC AUTO: 78.8 FL
MONOCYTES # BLD AUTO: 0.59 K/UL
MONOCYTES NFR BLD AUTO: 10.7 %
NEUTROPHILS # BLD AUTO: 3.07 K/UL
NEUTROPHILS NFR BLD AUTO: 55.9 %
PLATELET # BLD AUTO: 269 K/UL
RBC # BLD: 4.01 M/UL
RBC # FLD: 13.8 %
WBC # FLD AUTO: 5.49 K/UL

## 2019-01-07 PROBLEM — K21.9 GASTRO-ESOPHAGEAL REFLUX DISEASE WITHOUT ESOPHAGITIS: Chronic | Status: ACTIVE | Noted: 2018-11-29

## 2019-01-07 PROBLEM — K31.82 DIEULAFOY LESION (HEMORRHAGIC) OF STOMACH AND DUODENUM: Chronic | Status: ACTIVE | Noted: 2018-11-29

## 2019-01-09 ENCOUNTER — APPOINTMENT (OUTPATIENT)
Dept: SURGERY | Facility: CLINIC | Age: 24
End: 2019-01-09
Payer: MEDICAID

## 2019-01-09 VITALS
SYSTOLIC BLOOD PRESSURE: 115 MMHG | WEIGHT: 160.38 LBS | HEART RATE: 68 BPM | BODY MASS INDEX: 24.31 KG/M2 | TEMPERATURE: 98.4 F | OXYGEN SATURATION: 100 % | HEIGHT: 68.11 IN | DIASTOLIC BLOOD PRESSURE: 68 MMHG

## 2019-01-09 PROCEDURE — 84100 ASSAY OF PHOSPHORUS: CPT

## 2019-01-09 PROCEDURE — 36430 TRANSFUSION BLD/BLD COMPNT: CPT

## 2019-01-09 PROCEDURE — 96374 THER/PROPH/DIAG INJ IV PUSH: CPT

## 2019-01-09 PROCEDURE — 84295 ASSAY OF SERUM SODIUM: CPT

## 2019-01-09 PROCEDURE — 83690 ASSAY OF LIPASE: CPT

## 2019-01-09 PROCEDURE — P9016: CPT

## 2019-01-09 PROCEDURE — 85027 COMPLETE CBC AUTOMATED: CPT

## 2019-01-09 PROCEDURE — 85014 HEMATOCRIT: CPT

## 2019-01-09 PROCEDURE — 82330 ASSAY OF CALCIUM: CPT

## 2019-01-09 PROCEDURE — 80048 BASIC METABOLIC PNL TOTAL CA: CPT

## 2019-01-09 PROCEDURE — 71046 X-RAY EXAM CHEST 2 VIEWS: CPT

## 2019-01-09 PROCEDURE — 86850 RBC ANTIBODY SCREEN: CPT

## 2019-01-09 PROCEDURE — 85730 THROMBOPLASTIN TIME PARTIAL: CPT

## 2019-01-09 PROCEDURE — 82947 ASSAY GLUCOSE BLOOD QUANT: CPT

## 2019-01-09 PROCEDURE — 84132 ASSAY OF SERUM POTASSIUM: CPT

## 2019-01-09 PROCEDURE — 83735 ASSAY OF MAGNESIUM: CPT

## 2019-01-09 PROCEDURE — 99203 OFFICE O/P NEW LOW 30 MIN: CPT

## 2019-01-09 PROCEDURE — 86923 COMPATIBILITY TEST ELECTRIC: CPT

## 2019-01-09 PROCEDURE — 83605 ASSAY OF LACTIC ACID: CPT

## 2019-01-09 PROCEDURE — 99285 EMERGENCY DEPT VISIT HI MDM: CPT | Mod: 25

## 2019-01-09 PROCEDURE — 86900 BLOOD TYPING SEROLOGIC ABO: CPT

## 2019-01-09 PROCEDURE — 85610 PROTHROMBIN TIME: CPT

## 2019-01-09 PROCEDURE — 82803 BLOOD GASES ANY COMBINATION: CPT

## 2019-01-09 PROCEDURE — 82435 ASSAY OF BLOOD CHLORIDE: CPT

## 2019-01-09 PROCEDURE — 80053 COMPREHEN METABOLIC PANEL: CPT

## 2019-01-09 PROCEDURE — 86901 BLOOD TYPING SEROLOGIC RH(D): CPT

## 2019-01-16 ENCOUNTER — OUTPATIENT (OUTPATIENT)
Dept: OUTPATIENT SERVICES | Facility: HOSPITAL | Age: 24
LOS: 1 days | End: 2019-01-16
Payer: MEDICAID

## 2019-01-16 VITALS
DIASTOLIC BLOOD PRESSURE: 80 MMHG | WEIGHT: 162.04 LBS | SYSTOLIC BLOOD PRESSURE: 130 MMHG | HEART RATE: 76 BPM | RESPIRATION RATE: 15 BRPM | HEIGHT: 68 IN | OXYGEN SATURATION: 98 % | TEMPERATURE: 98 F

## 2019-01-16 DIAGNOSIS — Q24.9 CONGENITAL MALFORMATION OF HEART, UNSPECIFIED: ICD-10-CM

## 2019-01-16 DIAGNOSIS — N28.9 DISORDER OF KIDNEY AND URETER, UNSPECIFIED: Chronic | ICD-10-CM

## 2019-01-16 DIAGNOSIS — Q43.0 MECKEL'S DIVERTICULUM (DISPLACED) (HYPERTROPHIC): ICD-10-CM

## 2019-01-16 DIAGNOSIS — K21.9 GASTRO-ESOPHAGEAL REFLUX DISEASE WITHOUT ESOPHAGITIS: ICD-10-CM

## 2019-01-16 LAB
ANION GAP SERPL CALC-SCNC: 12 MEQ/L — SIGNIFICANT CHANGE UP (ref 7–14)
BLD GP AB SCN SERPL QL: NEGATIVE — SIGNIFICANT CHANGE UP
BUN SERPL-MCNC: 10 MG/DL — SIGNIFICANT CHANGE UP (ref 7–23)
CALCIUM SERPL-MCNC: 9.8 MG/DL — SIGNIFICANT CHANGE UP (ref 8.4–10.5)
CHLORIDE SERPL-SCNC: 100 MMOL/L — SIGNIFICANT CHANGE UP (ref 98–107)
CO2 SERPL-SCNC: 27 MMOL/L — SIGNIFICANT CHANGE UP (ref 22–31)
CREAT SERPL-MCNC: 0.97 MG/DL — SIGNIFICANT CHANGE UP (ref 0.5–1.3)
GLUCOSE SERPL-MCNC: 82 MG/DL — SIGNIFICANT CHANGE UP (ref 70–99)
HBA1C BLD-MCNC: 4.9 % — SIGNIFICANT CHANGE UP (ref 4–5.6)
HCT VFR BLD CALC: 31.9 % — LOW (ref 39–50)
HGB BLD-MCNC: 9.7 G/DL — LOW (ref 13–17)
MCHC RBC-ENTMCNC: 21.5 PG — LOW (ref 27–34)
MCHC RBC-ENTMCNC: 30.4 % — LOW (ref 32–36)
MCV RBC AUTO: 70.6 FL — LOW (ref 80–100)
NRBC # FLD: 0 K/UL — LOW (ref 25–125)
PLATELET # BLD AUTO: 377 K/UL — SIGNIFICANT CHANGE UP (ref 150–400)
PMV BLD: 10.1 FL — SIGNIFICANT CHANGE UP (ref 7–13)
POTASSIUM SERPL-MCNC: 4.3 MMOL/L — SIGNIFICANT CHANGE UP (ref 3.5–5.3)
POTASSIUM SERPL-SCNC: 4.3 MMOL/L — SIGNIFICANT CHANGE UP (ref 3.5–5.3)
RBC # BLD: 4.52 M/UL — SIGNIFICANT CHANGE UP (ref 4.2–5.8)
RBC # FLD: 16.6 % — HIGH (ref 10.3–14.5)
RH IG SCN BLD-IMP: POSITIVE — SIGNIFICANT CHANGE UP
SODIUM SERPL-SCNC: 139 MMOL/L — SIGNIFICANT CHANGE UP (ref 135–145)
WBC # BLD: 4.91 K/UL — SIGNIFICANT CHANGE UP (ref 3.8–10.5)
WBC # FLD AUTO: 4.91 K/UL — SIGNIFICANT CHANGE UP (ref 3.8–10.5)

## 2019-01-16 PROCEDURE — 93010 ELECTROCARDIOGRAM REPORT: CPT

## 2019-01-16 RX ORDER — SODIUM CHLORIDE 9 MG/ML
1000 INJECTION, SOLUTION INTRAVENOUS
Qty: 0 | Refills: 0 | Status: DISCONTINUED | OUTPATIENT
Start: 2019-01-22 | End: 2019-01-26

## 2019-01-16 NOTE — H&P PST ADULT - NEGATIVE ENMT SYMPTOMS
no nasal congestion/no tinnitus/no sinus symptoms/no vertigo/no ear pain/no hearing difficulty/no dysphagia

## 2019-01-16 NOTE — H&P PST ADULT - PMH
Anemia    Asthma    Dieulafoy lesion of stomach    GERD (gastroesophageal reflux disease)    Meckel's diverticulum    PFO (patent foramen ovale)  "as child, closed before age 2" Anemia    Asthma    Congenital heart disease  "hole in heart that closed before age 3-4"  Dieulafoy lesion of stomach    GERD (gastroesophageal reflux disease)    Meckel's diverticulum

## 2019-01-16 NOTE — H&P PST ADULT - HISTORY OF PRESENT ILLNESS
24 y/o Male with PMHx of asthma, GERD, Dieulafoy lesions (GI bleeding 3 years ago requiring 2 transfusions, EGD showed lesion in stomach, which was cauterized, then GI bleed again 11/2018 hospitalized at [facility] requiring 1 PRBC) presents to pre surgical testing.  Pt reports during GI workup, upper and lower endoscopies were does as well as a Meckel's scan that was positive for Meckel's diverticulum.   Pt is scheduled for laparoscopic anterior resection on 1/22/19.

## 2019-01-16 NOTE — H&P PST ADULT - LYMPHATIC
anterior cervical R/anterior cervical L/supraclavicular L/posterior cervical R/posterior cervical L/supraclavicular R

## 2019-01-16 NOTE — H&P PST ADULT - PROBLEM SELECTOR PLAN 2
Case discussed with Dr. Parsons, it is Dr. Parsons instructions to have patient obtain medical eval preop due to h/o congenital heart disease and c/o SOB on exertion.  Pt able to verbalize understanding.  Dr. Crum notified via email.

## 2019-01-16 NOTE — H&P PST ADULT - PROBLEM SELECTOR PLAN 1
Pt is scheduled for laparoscopic anterior resection on 1/22/19.   Pre op instructions including chlorhexidine wash given and pt able to verbalize understanding.

## 2019-01-16 NOTE — H&P PST ADULT - NEGATIVE MUSCULOSKELETAL SYMPTOMS
no arthralgia/no muscle cramps/no back pain/no arm pain R/no leg pain R/no arm pain L/no myalgia/no stiffness/no neck pain/no joint swelling/no muscle weakness/no leg pain L

## 2019-01-16 NOTE — H&P PST ADULT - NEGATIVE NEUROLOGICAL SYMPTOMS
no loss of sensation/no difficulty walking/no vertigo/no headache/no transient paralysis/no weakness/no paresthesias/no generalized seizures

## 2019-01-16 NOTE — H&P PST ADULT - NSANTHOSAYNRD_GEN_A_CORE
No. WESLEY screening performed.  STOP BANG Legend: 0-2 = LOW Risk; 3-4 = INTERMEDIATE Risk; 5-8 = HIGH Risk

## 2019-01-16 NOTE — H&P PST ADULT - MUSCULOSKELETAL
details… detailed exam ROM intact/no joint swelling/no joint erythema/normal strength/no joint warmth/no calf tenderness

## 2019-01-16 NOTE — H&P PST ADULT - NEGATIVE CARDIOVASCULAR SYMPTOMS
no chest pain/no orthopnea/no paroxysmal nocturnal dyspnea/no peripheral edema/no palpitations/no claudication

## 2019-01-17 ENCOUNTER — APPOINTMENT (OUTPATIENT)
Dept: INTERNAL MEDICINE | Facility: CLINIC | Age: 24
End: 2019-01-17
Payer: MEDICAID

## 2019-01-17 VITALS
DIASTOLIC BLOOD PRESSURE: 80 MMHG | OXYGEN SATURATION: 100 % | HEIGHT: 68.11 IN | TEMPERATURE: 97.7 F | RESPIRATION RATE: 12 BRPM | HEART RATE: 72 BPM | WEIGHT: 164 LBS | BODY MASS INDEX: 24.86 KG/M2 | SYSTOLIC BLOOD PRESSURE: 134 MMHG

## 2019-01-17 DIAGNOSIS — Z01.818 ENCOUNTER FOR OTHER PREPROCEDURAL EXAMINATION: ICD-10-CM

## 2019-01-17 PROCEDURE — 99215 OFFICE O/P EST HI 40 MIN: CPT

## 2019-01-18 ENCOUNTER — APPOINTMENT (OUTPATIENT)
Dept: CARDIOLOGY | Facility: CLINIC | Age: 24
End: 2019-01-18
Payer: MEDICAID

## 2019-01-18 PROCEDURE — 93306 TTE W/DOPPLER COMPLETE: CPT

## 2019-01-19 NOTE — ASSESSMENT
[Patient Optimized for Surgery] : Patient optimized for surgery [No Further Testing Recommended] : no further testing recommended [FreeTextEntry4] : PT AT ACCEPTABLE RISK FOR SURGERY WITH NO CONTRAINDICATIONS\par ECHO AND EKG  RESULTS NOTED AND REVIEWED WITH CARDIOLOGY AND PATIENT\par NO NEED FOR PREOP ATB PROPHYLAXIS

## 2019-01-19 NOTE — HISTORY OF PRESENT ILLNESS
[Asthma] : asthma [Chronic Anticoagulation] : no chronic anticoagulation [Chronic Kidney Disease] : no chronic kidney disease [Diabetes] : no diabetes [FreeTextEntry1] : MECKEL'S DIVERTICULUM  [FreeTextEntry2] : 1/22/19 [FreeTextEntry5] : CHD,RESOLVED BY AGE 3-4 SPONTANEOUSLY WITH NO SURGICAL INTERVENTION [FreeTextEntry7] : 2 D ECHO SHOWED NO VALVULAR DISEASE AND NO CONTRAINDICATION TO SURGERY

## 2019-01-21 ENCOUNTER — TRANSCRIPTION ENCOUNTER (OUTPATIENT)
Age: 24
End: 2019-01-21

## 2019-01-21 NOTE — ASU PATIENT PROFILE, ADULT - PMH
Anemia    Asthma    Congenital heart disease  "hole in heart that closed before age 3-4"  Dieulafoy lesion of stomach    GERD (gastroesophageal reflux disease)    Meckel's diverticulum

## 2019-01-22 ENCOUNTER — INPATIENT (INPATIENT)
Facility: HOSPITAL | Age: 24
LOS: 7 days | Discharge: HOME | End: 2019-01-30
Attending: SURGERY | Admitting: SURGERY
Payer: MEDICAID

## 2019-01-22 ENCOUNTER — RESULT REVIEW (OUTPATIENT)
Age: 24
End: 2019-01-22

## 2019-01-22 ENCOUNTER — APPOINTMENT (OUTPATIENT)
Dept: SURGERY | Facility: HOSPITAL | Age: 24
End: 2019-01-22

## 2019-01-22 VITALS
HEART RATE: 94 BPM | DIASTOLIC BLOOD PRESSURE: 81 MMHG | OXYGEN SATURATION: 96 % | HEIGHT: 68 IN | RESPIRATION RATE: 15 BRPM | SYSTOLIC BLOOD PRESSURE: 141 MMHG | TEMPERATURE: 98 F | WEIGHT: 162.04 LBS

## 2019-01-22 DIAGNOSIS — Q43.0 MECKEL'S DIVERTICULUM (DISPLACED) (HYPERTROPHIC): ICD-10-CM

## 2019-01-22 DIAGNOSIS — N28.9 DISORDER OF KIDNEY AND URETER, UNSPECIFIED: Chronic | ICD-10-CM

## 2019-01-22 LAB
BASOPHILS # BLD AUTO: 0.07 K/UL — SIGNIFICANT CHANGE UP (ref 0–0.2)
BASOPHILS NFR BLD AUTO: 1.5 % — SIGNIFICANT CHANGE UP (ref 0–2)
BLD GP AB SCN SERPL QL: NEGATIVE — SIGNIFICANT CHANGE UP
EOSINOPHIL # BLD AUTO: 0.06 K/UL — SIGNIFICANT CHANGE UP (ref 0–0.5)
EOSINOPHIL NFR BLD AUTO: 1.3 % — SIGNIFICANT CHANGE UP (ref 0–6)
GLUCOSE BLDC GLUCOMTR-MCNC: 98 MG/DL — SIGNIFICANT CHANGE UP (ref 70–99)
HCT VFR BLD CALC: 31.4 % — LOW (ref 39–50)
HGB BLD-MCNC: 9.5 G/DL — LOW (ref 13–17)
IMM GRANULOCYTES NFR BLD AUTO: 0.2 % — SIGNIFICANT CHANGE UP (ref 0–1.5)
LYMPHOCYTES # BLD AUTO: 1.51 K/UL — SIGNIFICANT CHANGE UP (ref 1–3.3)
LYMPHOCYTES # BLD AUTO: 33 % — SIGNIFICANT CHANGE UP (ref 13–44)
MCHC RBC-ENTMCNC: 20.8 PG — LOW (ref 27–34)
MCHC RBC-ENTMCNC: 30.3 % — LOW (ref 32–36)
MCV RBC AUTO: 68.9 FL — LOW (ref 80–100)
MONOCYTES # BLD AUTO: 0.57 K/UL — SIGNIFICANT CHANGE UP (ref 0–0.9)
MONOCYTES NFR BLD AUTO: 12.4 % — SIGNIFICANT CHANGE UP (ref 2–14)
NEUTROPHILS # BLD AUTO: 2.36 K/UL — SIGNIFICANT CHANGE UP (ref 1.8–7.4)
NEUTROPHILS NFR BLD AUTO: 51.6 % — SIGNIFICANT CHANGE UP (ref 43–77)
NRBC # FLD: 0 K/UL — LOW (ref 25–125)
PLATELET # BLD AUTO: 307 K/UL — SIGNIFICANT CHANGE UP (ref 150–400)
PMV BLD: 9.4 FL — SIGNIFICANT CHANGE UP (ref 7–13)
RBC # BLD: 4.56 M/UL — SIGNIFICANT CHANGE UP (ref 4.2–5.8)
RBC # FLD: 17.2 % — HIGH (ref 10.3–14.5)
RH IG SCN BLD-IMP: POSITIVE — SIGNIFICANT CHANGE UP
WBC # BLD: 4.58 K/UL — SIGNIFICANT CHANGE UP (ref 3.8–10.5)
WBC # FLD AUTO: 4.58 K/UL — SIGNIFICANT CHANGE UP (ref 3.8–10.5)

## 2019-01-22 PROCEDURE — 44120 REMOVAL OF SMALL INTESTINE: CPT

## 2019-01-22 PROCEDURE — 88304 TISSUE EXAM BY PATHOLOGIST: CPT | Mod: 26

## 2019-01-22 RX ORDER — CHOLECALCIFEROL (VITAMIN D3) 125 MCG
1 CAPSULE ORAL
Qty: 0 | Refills: 0 | COMMUNITY

## 2019-01-22 RX ORDER — ALBUTEROL 90 UG/1
0 AEROSOL, METERED ORAL
Qty: 0 | Refills: 0 | COMMUNITY

## 2019-01-22 RX ORDER — ACETAMINOPHEN 500 MG
1000 TABLET ORAL ONCE
Qty: 0 | Refills: 0 | Status: COMPLETED | OUTPATIENT
Start: 2019-01-23 | End: 2019-01-23

## 2019-01-22 RX ORDER — HYDROMORPHONE HYDROCHLORIDE 2 MG/ML
0.5 INJECTION INTRAMUSCULAR; INTRAVENOUS; SUBCUTANEOUS
Qty: 0 | Refills: 0 | Status: DISCONTINUED | OUTPATIENT
Start: 2019-01-22 | End: 2019-01-23

## 2019-01-22 RX ORDER — ACETAMINOPHEN 500 MG
1000 TABLET ORAL ONCE
Qty: 0 | Refills: 0 | Status: COMPLETED | OUTPATIENT
Start: 2019-01-22 | End: 2019-01-22

## 2019-01-22 RX ORDER — PANTOPRAZOLE SODIUM 20 MG/1
40 TABLET, DELAYED RELEASE ORAL
Qty: 0 | Refills: 0 | Status: DISCONTINUED | OUTPATIENT
Start: 2019-01-22 | End: 2019-01-23

## 2019-01-22 RX ORDER — ALBUTEROL 90 UG/1
2.5 AEROSOL, METERED ORAL ONCE
Qty: 0 | Refills: 0 | Status: DISCONTINUED | OUTPATIENT
Start: 2019-01-22 | End: 2019-01-30

## 2019-01-22 RX ORDER — ONDANSETRON 8 MG/1
4 TABLET, FILM COATED ORAL ONCE
Qty: 0 | Refills: 0 | Status: COMPLETED | OUTPATIENT
Start: 2019-01-22 | End: 2019-01-22

## 2019-01-22 RX ADMIN — HYDROMORPHONE HYDROCHLORIDE 0.5 MILLIGRAM(S): 2 INJECTION INTRAMUSCULAR; INTRAVENOUS; SUBCUTANEOUS at 14:30

## 2019-01-22 RX ADMIN — Medication 400 MILLIGRAM(S): at 21:43

## 2019-01-22 RX ADMIN — HYDROMORPHONE HYDROCHLORIDE 0.5 MILLIGRAM(S): 2 INJECTION INTRAMUSCULAR; INTRAVENOUS; SUBCUTANEOUS at 15:52

## 2019-01-22 RX ADMIN — Medication 1000 MILLIGRAM(S): at 22:05

## 2019-01-22 RX ADMIN — HYDROMORPHONE HYDROCHLORIDE 0.5 MILLIGRAM(S): 2 INJECTION INTRAMUSCULAR; INTRAVENOUS; SUBCUTANEOUS at 17:05

## 2019-01-22 RX ADMIN — ONDANSETRON 4 MILLIGRAM(S): 8 TABLET, FILM COATED ORAL at 20:22

## 2019-01-22 RX ADMIN — HYDROMORPHONE HYDROCHLORIDE 0.5 MILLIGRAM(S): 2 INJECTION INTRAMUSCULAR; INTRAVENOUS; SUBCUTANEOUS at 15:22

## 2019-01-22 RX ADMIN — Medication 400 MILLIGRAM(S): at 15:05

## 2019-01-22 RX ADMIN — HYDROMORPHONE HYDROCHLORIDE 0.5 MILLIGRAM(S): 2 INJECTION INTRAMUSCULAR; INTRAVENOUS; SUBCUTANEOUS at 17:30

## 2019-01-22 RX ADMIN — Medication 1000 MILLIGRAM(S): at 15:35

## 2019-01-22 RX ADMIN — HYDROMORPHONE HYDROCHLORIDE 0.5 MILLIGRAM(S): 2 INJECTION INTRAMUSCULAR; INTRAVENOUS; SUBCUTANEOUS at 15:00

## 2019-01-22 RX ADMIN — SODIUM CHLORIDE 100 MILLILITER(S): 9 INJECTION, SOLUTION INTRAVENOUS at 15:06

## 2019-01-22 NOTE — BRIEF OPERATIVE NOTE - PROCEDURE
<<-----Click on this checkbox to enter Procedure Small bowel resection  01/22/2019  laparoscopic  Active  [id number]

## 2019-01-22 NOTE — CHART NOTE - NSCHARTNOTEFT_GEN_A_CORE
General Surgery Post-Op Note    SUBJECTIVE:  This is a 23y Male POD 0 s/p small bowel resection for Meckel's diverticulum. Had one episode of 20cc yellow/clear emesis after walking around the floor. States after the emesis, he felt much better. Continues to have a mild amt of nausea related to pain, controlled with pain meds. Reports passing flatus. William CLD. Denies dizziness, CP, SOB. Tachycardic to 103 after emesis episode however, downtrending to 80s-90s.     OBJECTIVE:     ** VITAL SIGNS / I&O's **    Vital Signs Last 24 Hrs  T(C): 37.2 (22 Jan 2019 22:03), Max: 37.2 (22 Jan 2019 18:00)  T(F): 98.9 (22 Jan 2019 22:03), Max: 98.9 (22 Jan 2019 18:00)  HR: 82 (22 Jan 2019 22:03) (68 - 103)  BP: 116/67 (22 Jan 2019 22:03) (116/67 - 141/81)  BP(mean): 79 (22 Jan 2019 17:00) (76 - 91)  RR: 17 (22 Jan 2019 22:03) (10 - 17)  SpO2: 99% (22 Jan 2019 22:03) (93% - 100%)      22 Jan 2019 07:01  -  22 Jan 2019 22:52  --------------------------------------------------------  IN:    IV PiggyBack: 100 mL    lactated ringers.: 400 mL    Oral Fluid: 150 mL  Total IN: 650 mL    OUT:  Total OUT: 0 mL    Total NET: 650 mL          ** PHYSICAL EXAM **    -- CONSTITUTIONAL: Alert, in NAD  -- PULMONARY: non-labored respirations  -- ABDOMEN: soft, non-distended, appropriately TTP, dressings c/d/i  -- NEURO: A&Ox3    ** LABS **                          9.5    4.58  )-----------( 307      ( 22 Jan 2019 11:35 )             31.4             CAPILLARY BLOOD GLUCOSE      POCT Blood Glucose.: 98 mg/dL (22 Jan 2019 10:04)                MEDICATIONS  (STANDING):  lactated ringers. 1000 milliLiter(s) (100 mL/Hr) IV Continuous <Continuous>  pantoprazole    Tablet 40 milliGRAM(s) Oral before breakfast    MEDICATIONS  (PRN):  ALBUTerol    0.083%. 2.5 milliGRAM(s) Nebulizer once PRN Shortness of Breath and/or Wheezing  HYDROmorphone  Injectable 0.5 milliGRAM(s) IV Push every 10 minutes PRN Severe Pain (7 - 10)        Assessment:  This is a 23yMale POD 0 s/p SBR for Meckel's diverticulum.     Plan:  - Diet: CLD  - OOB, ambulate as tolerated  - Encourage use of IS  - Monitor dressings  - DVT ppx  - Monitor GIF    [facility] General Surgery- A Team  [id number]

## 2019-01-22 NOTE — BRIEF OPERATIVE NOTE - OPERATION/FINDINGS
Meckels identified immediately in RLQ. The segment was exteriorized and resected with a side-to-side functional end to end anastomosis. Mesentery was clamped and tied with 2-0 Vicryl sutures. Hemostasis was ensured and the anastomosis returned to the abdomen.

## 2019-01-23 LAB
ANION GAP SERPL CALC-SCNC: 15 MMO/L — HIGH (ref 7–14)
ANION GAP SERPL CALC-SCNC: 17 MMO/L — HIGH (ref 7–14)
BASOPHILS # BLD AUTO: 0 K/UL — SIGNIFICANT CHANGE UP (ref 0–0.2)
BASOPHILS NFR BLD AUTO: 0 % — SIGNIFICANT CHANGE UP (ref 0–2)
BUN SERPL-MCNC: 14 MG/DL — SIGNIFICANT CHANGE UP (ref 7–23)
BUN SERPL-MCNC: 15 MG/DL — SIGNIFICANT CHANGE UP (ref 7–23)
CALCIUM SERPL-MCNC: 8.7 MG/DL — SIGNIFICANT CHANGE UP (ref 8.4–10.5)
CALCIUM SERPL-MCNC: 9 MG/DL — SIGNIFICANT CHANGE UP (ref 8.4–10.5)
CHLORIDE SERPL-SCNC: 101 MMOL/L — SIGNIFICANT CHANGE UP (ref 98–107)
CHLORIDE SERPL-SCNC: 102 MMOL/L — SIGNIFICANT CHANGE UP (ref 98–107)
CO2 SERPL-SCNC: 19 MMOL/L — LOW (ref 22–31)
CO2 SERPL-SCNC: 22 MMOL/L — SIGNIFICANT CHANGE UP (ref 22–31)
CREAT SERPL-MCNC: 0.98 MG/DL — SIGNIFICANT CHANGE UP (ref 0.5–1.3)
CREAT SERPL-MCNC: 1.06 MG/DL — SIGNIFICANT CHANGE UP (ref 0.5–1.3)
EOSINOPHIL # BLD AUTO: 0 K/UL — SIGNIFICANT CHANGE UP (ref 0–0.5)
EOSINOPHIL NFR BLD AUTO: 0 % — SIGNIFICANT CHANGE UP (ref 0–6)
GLUCOSE SERPL-MCNC: 141 MG/DL — HIGH (ref 70–99)
GLUCOSE SERPL-MCNC: 169 MG/DL — HIGH (ref 70–99)
HCT VFR BLD CALC: 22.9 % — LOW (ref 39–50)
HCT VFR BLD CALC: 22.9 % — LOW (ref 39–50)
HCT VFR BLD CALC: 23.6 % — LOW (ref 39–50)
HCT VFR BLD CALC: 25.5 % — LOW (ref 39–50)
HGB BLD-MCNC: 6.9 G/DL — CRITICAL LOW (ref 13–17)
HGB BLD-MCNC: 7.2 G/DL — LOW (ref 13–17)
HGB BLD-MCNC: 7.3 G/DL — LOW (ref 13–17)
HGB BLD-MCNC: 8.3 G/DL — LOW (ref 13–17)
IMM GRANULOCYTES NFR BLD AUTO: 0.2 % — SIGNIFICANT CHANGE UP (ref 0–1.5)
LYMPHOCYTES # BLD AUTO: 0.75 K/UL — LOW (ref 1–3.3)
LYMPHOCYTES # BLD AUTO: 7.6 % — LOW (ref 13–44)
MAGNESIUM SERPL-MCNC: 1.5 MG/DL — LOW (ref 1.6–2.6)
MAGNESIUM SERPL-MCNC: 1.6 MG/DL — SIGNIFICANT CHANGE UP (ref 1.6–2.6)
MCHC RBC-ENTMCNC: 20.5 PG — LOW (ref 27–34)
MCHC RBC-ENTMCNC: 20.6 PG — LOW (ref 27–34)
MCHC RBC-ENTMCNC: 22.5 PG — LOW (ref 27–34)
MCHC RBC-ENTMCNC: 24.9 PG — LOW (ref 27–34)
MCHC RBC-ENTMCNC: 30.1 % — LOW (ref 32–36)
MCHC RBC-ENTMCNC: 30.5 % — LOW (ref 32–36)
MCHC RBC-ENTMCNC: 31.9 % — LOW (ref 32–36)
MCHC RBC-ENTMCNC: 32.5 % — SIGNIFICANT CHANGE UP (ref 32–36)
MCV RBC AUTO: 67.4 FL — LOW (ref 80–100)
MCV RBC AUTO: 68 FL — LOW (ref 80–100)
MCV RBC AUTO: 70.5 FL — LOW (ref 80–100)
MCV RBC AUTO: 76.6 FL — LOW (ref 80–100)
MONOCYTES # BLD AUTO: 0.64 K/UL — SIGNIFICANT CHANGE UP (ref 0–0.9)
MONOCYTES NFR BLD AUTO: 6.5 % — SIGNIFICANT CHANGE UP (ref 2–14)
NEUTROPHILS # BLD AUTO: 8.5 K/UL — HIGH (ref 1.8–7.4)
NEUTROPHILS NFR BLD AUTO: 85.7 % — HIGH (ref 43–77)
NRBC # FLD: 0 K/UL — LOW (ref 25–125)
PHOSPHATE SERPL-MCNC: 4.1 MG/DL — SIGNIFICANT CHANGE UP (ref 2.5–4.5)
PHOSPHATE SERPL-MCNC: 4.6 MG/DL — HIGH (ref 2.5–4.5)
PLATELET # BLD AUTO: 319 K/UL — SIGNIFICANT CHANGE UP (ref 150–400)
PLATELET # BLD AUTO: 368 K/UL — SIGNIFICANT CHANGE UP (ref 150–400)
PLATELET # BLD AUTO: 479 K/UL — HIGH (ref 150–400)
PLATELET # BLD AUTO: 497 K/UL — HIGH (ref 150–400)
PMV BLD: 10.2 FL — SIGNIFICANT CHANGE UP (ref 7–13)
PMV BLD: 10.3 FL — SIGNIFICANT CHANGE UP (ref 7–13)
PMV BLD: 10.4 FL — SIGNIFICANT CHANGE UP (ref 7–13)
PMV BLD: 10.4 FL — SIGNIFICANT CHANGE UP (ref 7–13)
POTASSIUM SERPL-MCNC: 4.1 MMOL/L — SIGNIFICANT CHANGE UP (ref 3.5–5.3)
POTASSIUM SERPL-MCNC: 4.2 MMOL/L — SIGNIFICANT CHANGE UP (ref 3.5–5.3)
POTASSIUM SERPL-SCNC: 4.1 MMOL/L — SIGNIFICANT CHANGE UP (ref 3.5–5.3)
POTASSIUM SERPL-SCNC: 4.2 MMOL/L — SIGNIFICANT CHANGE UP (ref 3.5–5.3)
RBC # BLD: 3.25 M/UL — LOW (ref 4.2–5.8)
RBC # BLD: 3.33 M/UL — LOW (ref 4.2–5.8)
RBC # BLD: 3.37 M/UL — LOW (ref 4.2–5.8)
RBC # BLD: 3.5 M/UL — LOW (ref 4.2–5.8)
RBC # FLD: 17.3 % — HIGH (ref 10.3–14.5)
RBC # FLD: 17.4 % — HIGH (ref 10.3–14.5)
RBC # FLD: 20.6 % — HIGH (ref 10.3–14.5)
RBC # FLD: 23.3 % — HIGH (ref 10.3–14.5)
SODIUM SERPL-SCNC: 138 MMOL/L — SIGNIFICANT CHANGE UP (ref 135–145)
SODIUM SERPL-SCNC: 138 MMOL/L — SIGNIFICANT CHANGE UP (ref 135–145)
WBC # BLD: 13.07 K/UL — HIGH (ref 3.8–10.5)
WBC # BLD: 9.54 K/UL — SIGNIFICANT CHANGE UP (ref 3.8–10.5)
WBC # BLD: 9.91 K/UL — SIGNIFICANT CHANGE UP (ref 3.8–10.5)
WBC # BLD: 9.93 K/UL — SIGNIFICANT CHANGE UP (ref 3.8–10.5)
WBC # FLD AUTO: 13.07 K/UL — HIGH (ref 3.8–10.5)
WBC # FLD AUTO: 9.54 K/UL — SIGNIFICANT CHANGE UP (ref 3.8–10.5)
WBC # FLD AUTO: 9.91 K/UL — SIGNIFICANT CHANGE UP (ref 3.8–10.5)
WBC # FLD AUTO: 9.93 K/UL — SIGNIFICANT CHANGE UP (ref 3.8–10.5)

## 2019-01-23 PROCEDURE — 93010 ELECTROCARDIOGRAM REPORT: CPT

## 2019-01-23 PROCEDURE — 71045 X-RAY EXAM CHEST 1 VIEW: CPT | Mod: 26

## 2019-01-23 PROCEDURE — 74019 RADEX ABDOMEN 2 VIEWS: CPT | Mod: 26

## 2019-01-23 PROCEDURE — 71045 X-RAY EXAM CHEST 1 VIEW: CPT | Mod: 26,77

## 2019-01-23 RX ORDER — OXYCODONE HYDROCHLORIDE 5 MG/1
10 TABLET ORAL EVERY 6 HOURS
Qty: 0 | Refills: 0 | Status: DISCONTINUED | OUTPATIENT
Start: 2019-01-23 | End: 2019-01-23

## 2019-01-23 RX ORDER — BENZOCAINE 10 %
1 GEL (GRAM) MUCOUS MEMBRANE
Qty: 0 | Refills: 0 | Status: DISCONTINUED | OUTPATIENT
Start: 2019-01-23 | End: 2019-01-25

## 2019-01-23 RX ORDER — MAGNESIUM SULFATE 500 MG/ML
2 VIAL (ML) INJECTION ONCE
Qty: 0 | Refills: 0 | Status: COMPLETED | OUTPATIENT
Start: 2019-01-23 | End: 2019-01-23

## 2019-01-23 RX ORDER — ONDANSETRON 8 MG/1
4 TABLET, FILM COATED ORAL ONCE
Qty: 0 | Refills: 0 | Status: COMPLETED | OUTPATIENT
Start: 2019-01-23 | End: 2019-01-23

## 2019-01-23 RX ORDER — OXYCODONE HYDROCHLORIDE 5 MG/1
5 TABLET ORAL EVERY 6 HOURS
Qty: 0 | Refills: 0 | Status: DISCONTINUED | OUTPATIENT
Start: 2019-01-23 | End: 2019-01-23

## 2019-01-23 RX ORDER — ACETAMINOPHEN 500 MG
1000 TABLET ORAL ONCE
Qty: 0 | Refills: 0 | Status: COMPLETED | OUTPATIENT
Start: 2019-01-23 | End: 2019-01-23

## 2019-01-23 RX ORDER — LANOLIN ALCOHOL/MO/W.PET/CERES
3 CREAM (GRAM) TOPICAL AT BEDTIME
Qty: 0 | Refills: 0 | Status: DISCONTINUED | OUTPATIENT
Start: 2019-01-23 | End: 2019-01-23

## 2019-01-23 RX ORDER — PANTOPRAZOLE SODIUM 20 MG/1
40 TABLET, DELAYED RELEASE ORAL DAILY
Qty: 0 | Refills: 0 | Status: DISCONTINUED | OUTPATIENT
Start: 2019-01-23 | End: 2019-01-26

## 2019-01-23 RX ADMIN — ONDANSETRON 4 MILLIGRAM(S): 8 TABLET, FILM COATED ORAL at 08:53

## 2019-01-23 RX ADMIN — Medication 1000 MILLIGRAM(S): at 03:40

## 2019-01-23 RX ADMIN — Medication 1000 MILLIGRAM(S): at 10:30

## 2019-01-23 RX ADMIN — SODIUM CHLORIDE 100 MILLILITER(S): 9 INJECTION, SOLUTION INTRAVENOUS at 10:13

## 2019-01-23 RX ADMIN — ONDANSETRON 4 MILLIGRAM(S): 8 TABLET, FILM COATED ORAL at 00:35

## 2019-01-23 RX ADMIN — SODIUM CHLORIDE 110 MILLILITER(S): 9 INJECTION, SOLUTION INTRAVENOUS at 19:00

## 2019-01-23 RX ADMIN — Medication 400 MILLIGRAM(S): at 19:00

## 2019-01-23 RX ADMIN — Medication 50 GRAM(S): at 06:57

## 2019-01-23 RX ADMIN — PANTOPRAZOLE SODIUM 40 MILLIGRAM(S): 20 TABLET, DELAYED RELEASE ORAL at 06:19

## 2019-01-23 RX ADMIN — Medication 400 MILLIGRAM(S): at 10:12

## 2019-01-23 RX ADMIN — Medication 400 MILLIGRAM(S): at 03:08

## 2019-01-23 NOTE — PROGRESS NOTE ADULT - SUBJECTIVE AND OBJECTIVE BOX
S: Patient seen and examined.  Patient with 4 episodes of emesis overnight and 2 bloody bowel movements.  Denies fever, chills, SOB, CP, dizziness.  Had H/H drop, and is being transfused 1 unit PRBCs.    O: Vital Signs  T(C): 37 (01-23 @ 06:20), Max: 37.2 (01-22 @ 18:00)  HR: 94 (01-23 @ 06:20) (68 - 103)  BP: 141/92 (01-23 @ 06:20) (116/67 - 141/92)  RR: 18 (01-23 @ 06:20) (10 - 18)  SpO2: 99% (01-23 @ 06:20) (93% - 100%)  01-22-19 @ 07:01  -  01-23-19 @ 07:00  --------------------------------------------------------  IN: 1700 mL / OUT: 1300 mL / NET: 400 mL      ** PHYSICAL EXAM **    -- CONSTITUTIONAL: Alert, in NAD  -- PULMONARY: non-labored respirations  -- ABDOMEN: soft, non-distended, appropriately TTP, dressings c/d/i  -- NEURO: A&Ox3                          6.9    9.91  )-----------( 368      ( 23 Jan 2019 05:24 )             22.9   01-23    138  |  101  |  15  ----------------------------<  141<H>  4.2   |  22  |  1.06    Ca    8.7      23 Jan 2019 05:24  Phos  4.1     01-23  Mg     1.6     01-23

## 2019-01-23 NOTE — PROGRESS NOTE ADULT - SUBJECTIVE AND OBJECTIVE BOX
Seen earlier today, Nausea and vomitting. mild distention. Hct noted. had 2 episodes of rectal bleeding . Likely staple  line . Will transfuse, Place NG and observe. Dheld

## 2019-01-24 LAB
ANION GAP SERPL CALC-SCNC: 10 MMO/L — SIGNIFICANT CHANGE UP (ref 7–14)
BUN SERPL-MCNC: 18 MG/DL — SIGNIFICANT CHANGE UP (ref 7–23)
CA-I BLD-SCNC: 1.04 MMOL/L — SIGNIFICANT CHANGE UP (ref 1.03–1.23)
CALCIUM SERPL-MCNC: 7.7 MG/DL — LOW (ref 8.4–10.5)
CHLORIDE SERPL-SCNC: 105 MMOL/L — SIGNIFICANT CHANGE UP (ref 98–107)
CO2 SERPL-SCNC: 24 MMOL/L — SIGNIFICANT CHANGE UP (ref 22–31)
CREAT SERPL-MCNC: 1.04 MG/DL — SIGNIFICANT CHANGE UP (ref 0.5–1.3)
FERRITIN SERPL-MCNC: 20.94 NG/ML — LOW (ref 30–400)
FOLATE SERPL-MCNC: 7.1 NG/ML — SIGNIFICANT CHANGE UP (ref 4.7–20)
GLUCOSE SERPL-MCNC: 136 MG/DL — HIGH (ref 70–99)
HCT VFR BLD CALC: 20.4 % — CRITICAL LOW (ref 39–50)
HCT VFR BLD CALC: 21.9 % — LOW (ref 39–50)
HCT VFR BLD CALC: 22 % — LOW (ref 39–50)
HCT VFR BLD CALC: 22.8 % — LOW (ref 39–50)
HCT VFR BLD CALC: 22.9 % — LOW (ref 39–50)
HGB BLD-MCNC: 6.8 G/DL — CRITICAL LOW (ref 13–17)
HGB BLD-MCNC: 7.4 G/DL — LOW (ref 13–17)
HGB BLD-MCNC: 7.5 G/DL — LOW (ref 13–17)
HGB BLD-MCNC: 7.5 G/DL — LOW (ref 13–17)
HGB BLD-MCNC: 7.6 G/DL — LOW (ref 13–17)
IRON SATN MFR SERPL: 20 UG/DL — LOW (ref 45–165)
IRON SATN MFR SERPL: 279 UG/DL — SIGNIFICANT CHANGE UP (ref 155–535)
LACTATE SERPL-SCNC: 1.3 MMOL/L — SIGNIFICANT CHANGE UP (ref 0.5–2)
LDH SERPL L TO P-CCNC: 150 U/L — SIGNIFICANT CHANGE UP (ref 135–225)
MAGNESIUM SERPL-MCNC: 2 MG/DL — SIGNIFICANT CHANGE UP (ref 1.6–2.6)
MCHC RBC-ENTMCNC: 24.9 PG — LOW (ref 27–34)
MCHC RBC-ENTMCNC: 25.4 PG — LOW (ref 27–34)
MCHC RBC-ENTMCNC: 25.5 PG — LOW (ref 27–34)
MCHC RBC-ENTMCNC: 25.7 PG — LOW (ref 27–34)
MCHC RBC-ENTMCNC: 26.4 PG — LOW (ref 27–34)
MCHC RBC-ENTMCNC: 32.8 % — SIGNIFICANT CHANGE UP (ref 32–36)
MCHC RBC-ENTMCNC: 33.3 % — SIGNIFICANT CHANGE UP (ref 32–36)
MCHC RBC-ENTMCNC: 33.3 % — SIGNIFICANT CHANGE UP (ref 32–36)
MCHC RBC-ENTMCNC: 33.6 % — SIGNIFICANT CHANGE UP (ref 32–36)
MCHC RBC-ENTMCNC: 34.2 % — SIGNIFICANT CHANGE UP (ref 32–36)
MCV RBC AUTO: 74.1 FL — LOW (ref 80–100)
MCV RBC AUTO: 76.1 FL — LOW (ref 80–100)
MCV RBC AUTO: 76.5 FL — LOW (ref 80–100)
MCV RBC AUTO: 77.1 FL — LOW (ref 80–100)
MCV RBC AUTO: 78.4 FL — LOW (ref 80–100)
NRBC # FLD: 0 K/UL — LOW (ref 25–125)
PHOSPHATE SERPL-MCNC: 2.7 MG/DL — SIGNIFICANT CHANGE UP (ref 2.5–4.5)
PLATELET # BLD AUTO: 232 K/UL — SIGNIFICANT CHANGE UP (ref 150–400)
PLATELET # BLD AUTO: 243 K/UL — SIGNIFICANT CHANGE UP (ref 150–400)
PLATELET # BLD AUTO: 267 K/UL — SIGNIFICANT CHANGE UP (ref 150–400)
PLATELET # BLD AUTO: 289 K/UL — SIGNIFICANT CHANGE UP (ref 150–400)
PLATELET # BLD AUTO: 296 K/UL — SIGNIFICANT CHANGE UP (ref 150–400)
PMV BLD: 10.2 FL — SIGNIFICANT CHANGE UP (ref 7–13)
PMV BLD: 10.2 FL — SIGNIFICANT CHANGE UP (ref 7–13)
PMV BLD: 10.3 FL — SIGNIFICANT CHANGE UP (ref 7–13)
PMV BLD: 9.2 FL — SIGNIFICANT CHANGE UP (ref 7–13)
PMV BLD: 9.5 FL — SIGNIFICANT CHANGE UP (ref 7–13)
POTASSIUM SERPL-MCNC: 4.3 MMOL/L — SIGNIFICANT CHANGE UP (ref 3.5–5.3)
POTASSIUM SERPL-SCNC: 4.3 MMOL/L — SIGNIFICANT CHANGE UP (ref 3.5–5.3)
RBC # BLD: 2.68 M/UL — LOW (ref 4.2–5.8)
RBC # BLD: 2.84 M/UL — LOW (ref 4.2–5.8)
RBC # BLD: 2.92 M/UL — LOW (ref 4.2–5.8)
RBC # BLD: 2.97 M/UL — LOW (ref 4.2–5.8)
RBC # BLD: 2.98 M/UL — LOW (ref 4.2–5.8)
RBC # FLD: 21.1 % — HIGH (ref 10.3–14.5)
RBC # FLD: 21.4 % — HIGH (ref 10.3–14.5)
RBC # FLD: 21.8 % — HIGH (ref 10.3–14.5)
RBC # FLD: 22.5 % — HIGH (ref 10.3–14.5)
RBC # FLD: 22.6 % — HIGH (ref 10.3–14.5)
RETICS #: 84 K/UL — SIGNIFICANT CHANGE UP (ref 25–125)
RETICS/RBC NFR: 2.9 % — HIGH (ref 0.5–2.5)
SODIUM SERPL-SCNC: 139 MMOL/L — SIGNIFICANT CHANGE UP (ref 135–145)
UIBC SERPL-MCNC: 259.4 UG/DL — SIGNIFICANT CHANGE UP (ref 110–370)
VIT B12 SERPL-MCNC: 369 PG/ML — SIGNIFICANT CHANGE UP (ref 200–900)
WBC # BLD: 10.75 K/UL — HIGH (ref 3.8–10.5)
WBC # BLD: 10.81 K/UL — HIGH (ref 3.8–10.5)
WBC # BLD: 10.99 K/UL — HIGH (ref 3.8–10.5)
WBC # BLD: 11.25 K/UL — HIGH (ref 3.8–10.5)
WBC # BLD: 12.02 K/UL — HIGH (ref 3.8–10.5)
WBC # FLD AUTO: 10.75 K/UL — HIGH (ref 3.8–10.5)
WBC # FLD AUTO: 10.81 K/UL — HIGH (ref 3.8–10.5)
WBC # FLD AUTO: 10.99 K/UL — HIGH (ref 3.8–10.5)
WBC # FLD AUTO: 11.25 K/UL — HIGH (ref 3.8–10.5)
WBC # FLD AUTO: 12.02 K/UL — HIGH (ref 3.8–10.5)

## 2019-01-24 PROCEDURE — 74178 CT ABD&PLV WO CNTR FLWD CNTR: CPT | Mod: 26

## 2019-01-24 PROCEDURE — 93010 ELECTROCARDIOGRAM REPORT: CPT

## 2019-01-24 PROCEDURE — 99223 1ST HOSP IP/OBS HIGH 75: CPT | Mod: 25

## 2019-01-24 PROCEDURE — 99222 1ST HOSP IP/OBS MODERATE 55: CPT | Mod: GC

## 2019-01-24 PROCEDURE — 74174 CTA ABD&PLVS W/CONTRAST: CPT | Mod: 26

## 2019-01-24 RX ORDER — ONDANSETRON 8 MG/1
4 TABLET, FILM COATED ORAL ONCE
Qty: 0 | Refills: 0 | Status: COMPLETED | OUTPATIENT
Start: 2019-01-24 | End: 2019-01-24

## 2019-01-24 RX ORDER — ACETAMINOPHEN 500 MG
1000 TABLET ORAL ONCE
Qty: 0 | Refills: 0 | Status: COMPLETED | OUTPATIENT
Start: 2019-01-24 | End: 2019-01-24

## 2019-01-24 RX ADMIN — SODIUM CHLORIDE 110 MILLILITER(S): 9 INJECTION, SOLUTION INTRAVENOUS at 10:54

## 2019-01-24 RX ADMIN — PANTOPRAZOLE SODIUM 40 MILLIGRAM(S): 20 TABLET, DELAYED RELEASE ORAL at 10:51

## 2019-01-24 RX ADMIN — ONDANSETRON 4 MILLIGRAM(S): 8 TABLET, FILM COATED ORAL at 22:50

## 2019-01-24 RX ADMIN — Medication 1000 MILLIGRAM(S): at 05:04

## 2019-01-24 RX ADMIN — Medication 1000 MILLIGRAM(S): at 17:10

## 2019-01-24 RX ADMIN — Medication 400 MILLIGRAM(S): at 04:34

## 2019-01-24 RX ADMIN — Medication 400 MILLIGRAM(S): at 16:40

## 2019-01-24 RX ADMIN — Medication 400 MILLIGRAM(S): at 10:51

## 2019-01-24 RX ADMIN — Medication 1000 MILLIGRAM(S): at 11:21

## 2019-01-24 NOTE — CHART NOTE - NSCHARTNOTEFT_GEN_A_CORE
Patient seen and examined overnight.  Patient is POD 1 s/p meckel's diverticulectomy with primary SB anastamosis. Concern for N/V during the day with NGT placement as well as BRBPR.  During the day patient was found to be anemic ( Hct dropped from 31.3 to 23) and transfused one unit of blood without an appropriate response (Hct 22.9).  Transfused a second unit of blood and Hct uptrended to 25.  HR was elevated in the 120s-140s which decreased to the 110s with 2 Units blood transfusion. BP stable with SBP 120s to 140s. Called to bedside as repeat Hct 22 from 25. Patient complaining of weakness earlier in the day but says feels better.  Abdomen is soft, mildly distended but not remarkably tender to palpation without rebound or guarding. Denies any further bloody bowel movements. NGT with bilious output (about 300cc in canister). Spoke with attending Dr. Crum. Since patient mostly likely with intraluminal bleeding from the staple line unlikely that CTAP would be of much use as patient is not becoming more distended and low likelihood of intraperitoneal bleeding based on this exam. Overall hemodynamics are improving.  Plan to transfuse additional unit of PRBCs and check a repeat CBC and lactate.  Will continue to monitor    [facility] R3 x [phone]

## 2019-01-24 NOTE — CONSULT NOTE ADULT - ASSESSMENT
22 y/o M, admitted with anemia with BRBPR due to Merkel's diverticulum s/p small bowel resection. He was found to be anemic and tachycardic post procedure, for which he required total of 3 U PRBC. Hematology consulted for anemia. Baseline Hb 8-10 gm/dl with MCV in 70s in 11/2018. 24 y/o M, admitted with anemia with BRBPR due to Merkel's diverticulum s/p small bowel resection. He was found to be anemic and tachycardic post procedure, for which he required total of 3 U PRBC. Hematology consulted for anemia. Baseline Hb 8-10 gm/dl with MCV in 70s in 11/2018.    1. Anemia:  -With low MCV. Patient with h/o Merkel's diverticulum s/p small bowel resection on 1/23. Post procedure patient received 3 U PRBC with inappropriate response to transfusion.  -Peripheral smear reviewed with microcytic/hypochromic to normochromic cells.   -Anemia w/u-retics, serum iron, TIBC, ferritin, Vitamin b12, folate level, LDH. Patient may have underlying hemoglobinopathies given family h/o-however, cannot check Hb electrophoresis now due to recent blood transfusion and possible iron deficiency state.  -CT angio abdomen with no evidence of active bleeding.  -Will f/u anemia w/u. However, inappropriate response to transfusion with hemodynamic stability poses a concern for slow bleeding- may need bleeding scan?   -Will f/u on result of blood work for further recommendation.    D/W patient and team.    Kaye Hernandez, PGY VI  Hematology/Oncology  Pager: [phone] ( After 5 pm and on weekends, please page the on-call fellow). 24 y/o M, admitted with anemia with BRBPR due to Merkel's diverticulum s/p small bowel resection. He was found to be anemic and tachycardic post procedure, for which he required total of 3 U PRBC. Hematology consulted for anemia. Baseline Hb 8-10 gm/dl with MCV in 70s in 11/2018.    1. Anemia:  -With low MCV. Patient with h/o Merkel's diverticulum s/p small bowel resection on 1/23. Post procedure patient received 3 U PRBC with inappropriate response to transfusion.  -Peripheral smear reviewed with microcytic/hypochromic to normochromic cells.   -Anemia w/u-retics, serum iron, TIBC, ferritin, Vitamin b12, folate level, LDH. Patient may have underlying hemoglobinopathies given family h/o-however, cannot check Hb electrophoresis now due to recent blood transfusion and possible iron deficiency state.  -CT angio abdomen with no evidence of active bleeding.  -Will f/u anemia w/u. However, inappropriate response to transfusion with hemodynamic stability poses a concern for slow bleeding- may need bleeding scan? If Hb remains stable, can monitor with blood work daily.   -Will f/u on result of blood work for further recommendation.    D/W patient and team.    Kaye Hernandez, PGY VI  Hematology/Oncology  Pager: [phone] ( After 5 pm and on weekends, please page the on-call fellow).

## 2019-01-24 NOTE — CONSULT NOTE ADULT - SUBJECTIVE AND OBJECTIVE BOX
HPI:    PAST MEDICAL & SURGICAL HISTORY:  Congenital heart disease: &quot;hole in heart that closed before age 3-4&quot;  Anemia  Meckel's diverticulum  GERD (gastroesophageal reflux disease)  Dieulafoy lesion of stomach  Asthma  Ureteral disorder: surgery at 4 months of age    FAMILY HISTORY:  Family history of acute myocardial infarction (Uncle)  Family history of diabetes mellitus (Grandparent)    Social History  MEDICATIONS  (STANDING):  lactated ringers. 1000 milliLiter(s) (110 mL/Hr) IV Continuous <Continuous>  pantoprazole  Injectable 40 milliGRAM(s) IV Push daily    MEDICATIONS  (PRN):  ALBUTerol    0.083%. 2.5 milliGRAM(s) Nebulizer once PRN Shortness of Breath and/or Wheezing  benzocaine 20% Spray 1 Spray(s) Topical every 3 hours PRN throat irritation    No Known Allergies    REVIEW OF SYSTEMS      General:	    Skin/Breast:  	  Ophthalmologic:  	  ENMT:	    Respiratory and Thorax:  	  Cardiovascular:	    Gastrointestinal:	    Genitourinary:	    Musculoskeletal:	    Neurological:	    Psychiatric:	    Hematology/Lymphatics:	    Endocrine:	    Allergic/Immunologic:	  Vital Signs Last 24 Hrs  T(C): 37.6 (24 Jan 2019 10:38), Max: 37.7 (23 Jan 2019 21:23)  T(F): 99.6 (24 Jan 2019 10:38), Max: 99.8 (23 Jan 2019 21:23)  HR: 107 (24 Jan 2019 10:38) (104 - 124)  BP: 122/74 (24 Jan 2019 10:38) (122/74 - 142/82)  BP(mean): --  RR: 18 (24 Jan 2019 10:38) (17 - 18)  SpO2: 98% (24 Jan 2019 10:38) (97% - 119%)  Physical Examination  Constitutional: Alert, oriented X3  Eyes: Normal  ENMT: normal  Neck: No lymphadneopathy  Respiratory: b/l clear to auscultation  Cardiovascular: S1,S2,M0  Abdomen: Soft, non tender, BS present  Gastrointestinal: soft, non tender, BS present  Extremities: soft, no edema  Neurological: intact  Skin: no petechiae  Musculoskeletal: normal  Psychiatric: normal                            7.5    11.25 )-----------( 232      ( 24 Jan 2019 11:24 )             21.9     01-24    139  |  105  |  18  ----------------------------<  136<H>  4.3   |  24  |  1.04    Ca    7.7<L>      24 Jan 2019 06:00  Phos  2.7     01-24  Mg     2.0     01-24        Radiology  Assessment and Plan HPI:    Mr. Ames is a 24 y/o M, admitted with anemia with BRBPR due to Merkel's diverticulum s/p small bowel resection. He was found to be anemic and tachycardic post procedure, for which he required total of 3 U PRBC.             PAST MEDICAL & SURGICAL HISTORY:  Congenital heart disease: &quot;hole in heart that closed before age 3-4&quot;  Anemia  Meckel's diverticulum  GERD (gastroesophageal reflux disease)  Dieulafoy lesion of stomach  Asthma  Ureteral disorder: surgery at 4 months of age    FAMILY HISTORY:  Family history of acute myocardial infarction (Uncle)  Family history of diabetes mellitus (Grandparent)    Social History  MEDICATIONS  (STANDING):  lactated ringers. 1000 milliLiter(s) (110 mL/Hr) IV Continuous <Continuous>  pantoprazole  Injectable 40 milliGRAM(s) IV Push daily    MEDICATIONS  (PRN):  ALBUTerol    0.083%. 2.5 milliGRAM(s) Nebulizer once PRN Shortness of Breath and/or Wheezing  benzocaine 20% Spray 1 Spray(s) Topical every 3 hours PRN throat irritation    No Known Allergies    REVIEW OF SYSTEMS    10 points ROS is negative except for the ones in HPI.  		  Vital Signs Last 24 Hrs  T(C): 37.6 (24 Jan 2019 10:38), Max: 37.7 (23 Jan 2019 21:23)  T(F): 99.6 (24 Jan 2019 10:38), Max: 99.8 (23 Jan 2019 21:23)  HR: 107 (24 Jan 2019 10:38) (104 - 124)  BP: 122/74 (24 Jan 2019 10:38) (122/74 - 142/82)  BP(mean): --  RR: 18 (24 Jan 2019 10:38) (17 - 18)  SpO2: 98% (24 Jan 2019 10:38) (97% - 119%)    Physical Examination  Constitutional: Alert, oriented X3  Eyes: Normal  ENMT: normal  Neck: No lymphadenopathy  Respiratory: b/l clear to auscultation  Cardiovascular: S1,S2,M0  Abdomen: Soft, incision site intact,   Extremities: soft, no edema  Neurological: intact  Skin: no petechiae  Musculoskeletal: normal  Psychiatric: normal                            7.5    11.25 )-----------( 232      ( 24 Jan 2019 11:24 )             21.9     01-24    139  |  105  |  18  ----------------------------<  136<H>  4.3   |  24  |  1.04    Ca    7.7<L>      24 Jan 2019 06:00  Phos  2.7     01-24  Mg     2.0     01-24        Radiology  Assessment and Plan HPI:    Mr. Ames is a 24 y/o M, admitted with anemia with BRBPR due to Merkel's diverticulum s/p small bowel resection. He was found to be anemic and tachycardic post procedure, for which he required total of 3 U PRBC. Hb was 6.9, with inappropriate response to the blood transfusion, Hb 7/5 gm/dl today. Patient denies any BRBPR, but feels weak, fatigue. His heart rate is better.            PAST MEDICAL & SURGICAL HISTORY:  Congenital heart disease: &quot;hole in heart that closed before age 3-4&quot;  Anemia  Meckel's diverticulum  GERD (gastroesophageal reflux disease)  Dieulafoy lesion of stomach  Asthma  Ureteral disorder: surgery at 4 months of age    FAMILY HISTORY:  Family history of acute myocardial infarction (Uncle)  Family history of diabetes mellitus (Grandparent)    Social History  MEDICATIONS  (STANDING):  lactated ringers. 1000 milliLiter(s) (110 mL/Hr) IV Continuous <Continuous>  pantoprazole  Injectable 40 milliGRAM(s) IV Push daily    MEDICATIONS  (PRN):  ALBUTerol    0.083%. 2.5 milliGRAM(s) Nebulizer once PRN Shortness of Breath and/or Wheezing  benzocaine 20% Spray 1 Spray(s) Topical every 3 hours PRN throat irritation    No Known Allergies    REVIEW OF SYSTEMS    10 points ROS is negative except for the ones in HPI.  		  Vital Signs Last 24 Hrs  T(C): 37.6 (24 Jan 2019 10:38), Max: 37.7 (23 Jan 2019 21:23)  T(F): 99.6 (24 Jan 2019 10:38), Max: 99.8 (23 Jan 2019 21:23)  HR: 107 (24 Jan 2019 10:38) (104 - 124)  BP: 122/74 (24 Jan 2019 10:38) (122/74 - 142/82)  BP(mean): --  RR: 18 (24 Jan 2019 10:38) (17 - 18)  SpO2: 98% (24 Jan 2019 10:38) (97% - 119%)    Physical Examination  Constitutional: Alert, oriented X3  Eyes: Normal  ENMT: normal  Neck: No lymphadenopathy  Respiratory: b/l clear to auscultation  Cardiovascular: S1,S2,M0  Abdomen: Soft, incision site intact,   Extremities: soft, no edema  Neurological: intact  Skin: no petechiae  Musculoskeletal: normal  Psychiatric: normal                            7.5    11.25 )-----------( 232      ( 24 Jan 2019 11:24 )             21.9     01-24    139  |  105  |  18  ----------------------------<  136<H>  4.3   |  24  |  1.04    Ca    7.7<L>      24 Jan 2019 06:00  Phos  2.7     01-24  Mg     2.0     01-24        Radiology  Assessment and Plan HPI:    Mr. Ames is a 22 y/o M, admitted with anemia with BRBPR due to Merkel's diverticulum s/p small bowel resection. He was found to be anemic and tachycardic post procedure, for which he required total of 3 U PRBC. Hb was 6.9, with inappropriate response to the blood transfusion, Hb 7/5 gm/dl today. Patient denies any BRBPR, but feels weak, fatigue. His heart rate is better.  He overall feels better than yesterday. He continues to have abdominal discomfort and pain which is controlled. No bowel movement yet, but passing gas.  Patient states he has family h/o anemia- unknown type.   No weight loss, night sweats, fever, chills. Bladder habits are normal.     PAST MEDICAL & SURGICAL HISTORY:  Congenital heart disease: &quot;hole in heart that closed before age 3-4&quot;  Anemia  Meckel's diverticulum  GERD (gastroesophageal reflux disease)  Dieulafoy lesion of stomach  Asthma  Ureteral disorder: surgery at 4 months of age    FAMILY HISTORY:  Family history of acute myocardial infarction (Uncle)  Family history of diabetes mellitus (Grandparent)    Social History  MEDICATIONS  (STANDING):  lactated ringers. 1000 milliLiter(s) (110 mL/Hr) IV Continuous <Continuous>  pantoprazole  Injectable 40 milliGRAM(s) IV Push daily    MEDICATIONS  (PRN):  ALBUTerol    0.083%. 2.5 milliGRAM(s) Nebulizer once PRN Shortness of Breath and/or Wheezing  benzocaine 20% Spray 1 Spray(s) Topical every 3 hours PRN throat irritation    No Known Allergies    REVIEW OF SYSTEMS    10 points ROS is negative except for the ones in HPI.  		  Vital Signs Last 24 Hrs  T(C): 37.6 (24 Jan 2019 10:38), Max: 37.7 (23 Jan 2019 21:23)  T(F): 99.6 (24 Jan 2019 10:38), Max: 99.8 (23 Jan 2019 21:23)  HR: 107 (24 Jan 2019 10:38) (104 - 124)  BP: 122/74 (24 Jan 2019 10:38) (122/74 - 142/82)  BP(mean): --  RR: 18 (24 Jan 2019 10:38) (17 - 18)  SpO2: 98% (24 Jan 2019 10:38) (97% - 119%)    Physical Examination  Constitutional: Alert, oriented X3  Eyes: Normal  ENMT: normal  Neck: No lymphadenopathy  Respiratory: b/l clear to auscultation  Cardiovascular: S1,S2,M0  Abdomen: Soft, incision site intact, NG tube +, BS minimal  Extremities: soft, no edema  Neurological: intact  Skin: no petechiae  Musculoskeletal: normal  Psychiatric: normal                            7.5    11.25 )-----------( 232      ( 24 Jan 2019 11:24 )             21.9     01-24    139  |  105  |  18  ----------------------------<  136<H>  4.3   |  24  |  1.04    Ca    7.7<L>      24 Jan 2019 06:00  Phos  2.7     01-24  Mg     2.0     01-24        Radiology  Assessment and Plan

## 2019-01-24 NOTE — CONSULT NOTE ADULT - ATTENDING COMMENTS
22YO M POD2 after SB resection with anastomosis for Meckel's diverticula s/p 3uPRBC for dropping hematocrit and tachycardia. Tachycardia improving, CTA negative for intra-abdominal bleeding, asymptomatic at this time.    Neuro  -A&Ox3, no medications    CVS  -Tachycardia improving with transfusion  -Monitor vitals     Resp  -Continue Albuterol PRN for asthma  -Has not needed albuterol for about 1 year    GI  -s/p SB resection  -No signs of intraabdominal bleeding  -Monitor abdomen/ incisions  -Monitor for signs of GI bleeding      -Zhang in place adequate urine output    Endo  -No active issues    Heme:  -Recommend heme consult for anemia    Dispo: Floor, SICU will continue to follow [facility]    SICU Diagnosis: Anemia, repsiratory abnormality.  The patient is a critical care patient with life threatening hemodynamic and metabolic instability in SICU.  I have personally interviewed when possible and examined the patient, reviewed data and laboratory tests/x-rays and all pertinent electronic images.  I was physically present for the key portions of the evaluation and management (E/M) service provided.   The SICU team has a constant risk benefit analyzes discussion with the primary team, all consultants, House Staff and PA's on all decisions.  These diagnoses are unrelated to the surgical procedure noted above.  I meet with family if needed to get further history, discuss the case and make care decisions for this patient who might not be able to participate.  Time involved in performance of separately billable procedures was not counted toward my critical care time. There is no overlap.  I spent 55-75 minutes of critical care time for the diagnoses, assessment, plan and interventions.
patient 23 years old male with iron deficiency anemia secondary to merkel diverticulum----s/p resection on Jan 23. However after surgery patient did not respond to pRBC transfusion accordingly. It is likely due to active bleeding rather than chronic iron deficiency anemia. Please follow up closely with Surgeon.

## 2019-01-24 NOTE — CONSULT NOTE ADULT - ASSESSMENT
A/P: Pt is a 22YO M POD2 after SB resection with anastomosis for Meckel's diverticula s/p 3uPRBC for dropping hematocrit and tachycardia. Tachycardia improving, CTA negative for intra-abdominal bleeding, asymptomatic at this time.    Neuro  -A&Ox3, no medications    CVS  -Tachycardia improving with transfusion  -Monitor vitals     Resp  -Continue Albuterol PRN for asthma  -Has not needed albuterol for about 1 year    GI  -s/p SB resection  -No signs of intraabdominal bleeding  -Monitor stool A/P: Pt is a 22YO M POD2 after SB resection with anastomosis for Meckel's diverticula s/p 3uPRBC for dropping hematocrit and tachycardia. Tachycardia improving, CTA negative for intra-abdominal bleeding, asymptomatic at this time.    Neuro  -A&Ox3, no medications    CVS  -Tachycardia improving with transfusion  -Monitor vitals     Resp  -Continue Albuterol PRN for asthma  -Has not needed albuterol for about 1 year    GI  -s/p SB resection  -No signs of intraabdominal bleeding  -Monitor abdomen/ incisions  -Monitor for signs of GI bleeding      -Zhang in place adequate urine output    Endo  -No active issues    Heme:  -Recommend heme consult for anemia    Dispo: Floor, SICU will continue to follow    SICU Diagnosis: Anemia

## 2019-01-24 NOTE — CHART NOTE - NSCHARTNOTEFT_GEN_A_CORE
Notified that patient's hematocrit has dropped again from 22 to 20. Patient currently receiving third unit of PRBCs. Comfortable, states pain is improving.  /75, SpO2 98% on RA. Making adequate urine in the winkler catheter.  Abdomen is unchanged, no rebound or guarding.  Lactate returned at 1.3.  Patient has not had any bloody bowel movements since the day prior. Will complete blood transfusion and check a repeat CBC.     [facility] R3 x [phone]

## 2019-01-24 NOTE — PROVIDER CONTACT NOTE (CRITICAL VALUE NOTIFICATION) - ASSESSMENT
AOX4. No c/o of any pain.1 unit of PRBC in progress for hgb 7.3. AOx4. T: 98.4, HR: 117, R: 17, BP: 142/ 75  No c/o of any pain.  1 unit of PRBC in progress for hgb 7.3.

## 2019-01-24 NOTE — PROGRESS NOTE ADULT - SUBJECTIVE AND OBJECTIVE BOX
Continues to drop Hct. No rectral bleeding since 1/23 AM . Abdominal exam , minimal tenderness and distention. Etiology of blood loss unclear, intraperitoneal? For emergent CT angio. DHeld General Surgery Progress Note     Subjective: Patient was seen and examined during morning rounds. Patient has received three units of blood over the past 24 hours. No bloody BMs overnight. Making adequate urine. Due to continuous drop in H/H, patient to under emergent CT angio to Pt denies f/c, n/v, SOB, CP/ABP, lightheadedness.    As per Attending: Continues to drop Hct. No rectral bleeding since 1/23 AM . Abdominal exam , minimal tenderness and distention. Etiology of blood loss unclear, intraperitoneal? For emergent CT angio. DHeld      Objective:    Physical Exam:  Gen: NAD, awake and alert  Head/Neck: NC/AT  Resp: CTABL, nonlabored  Abdomen: soft, minimal tenderness, mild distention, no guarding or rebounding tenderness   Extremeties: no edema, gross sensation intact, gross motor function intact      MEDICATIONS  (STANDING):  lactated ringers. 1000 milliLiter(s) (110 mL/Hr) IV Continuous <Continuous>  pantoprazole  Injectable 40 milliGRAM(s) IV Push daily    MEDICATIONS  (PRN):  ALBUTerol    0.083%. 2.5 milliGRAM(s) Nebulizer once PRN Shortness of Breath and/or Wheezing  benzocaine 20% Spray 1 Spray(s) Topical every 3 hours PRN throat irritation      Vital Signs Last 24 Hrs  T(C): 37.6 (24 Jan 2019 10:38), Max: 37.7 (23 Jan 2019 21:23)  T(F): 99.6 (24 Jan 2019 10:38), Max: 99.8 (23 Jan 2019 21:23)  HR: 107 (24 Jan 2019 10:38) (104 - 124)  BP: 122/74 (24 Jan 2019 10:38) (122/74 - 142/82)  BP(mean): --  RR: 18 (24 Jan 2019 10:38) (17 - 18)  SpO2: 98% (24 Jan 2019 10:38) (97% - 119%)      01-23-19 @ 07:01  -  01-24-19 @ 07:00  --------------------------------------------------------  IN: 2920 mL / OUT: 1520 mL / NET: 1400 mL    01-24-19 @ 07:01  -  01-24-19 @ 14:04  --------------------------------------------------------  IN: 440 mL / OUT: 900 mL / NET: -460 mL        LABS:                        7.5    11.25 )-----------( 232      ( 24 Jan 2019 11:24 )             21.9     01-24    139  |  105  |  18  ----------------------------<  136<H>  4.3   |  24  |  1.04    Ca    7.7<L>      24 Jan 2019 06:00  Phos  2.7     01-24  Mg     2.0     01-24

## 2019-01-25 LAB
ANION GAP SERPL CALC-SCNC: 11 MMO/L — SIGNIFICANT CHANGE UP (ref 7–14)
ANION GAP SERPL CALC-SCNC: 12 MMO/L — SIGNIFICANT CHANGE UP (ref 7–14)
BUN SERPL-MCNC: 15 MG/DL — SIGNIFICANT CHANGE UP (ref 7–23)
BUN SERPL-MCNC: 15 MG/DL — SIGNIFICANT CHANGE UP (ref 7–23)
CALCIUM SERPL-MCNC: 8.4 MG/DL — SIGNIFICANT CHANGE UP (ref 8.4–10.5)
CALCIUM SERPL-MCNC: 8.4 MG/DL — SIGNIFICANT CHANGE UP (ref 8.4–10.5)
CHLORIDE SERPL-SCNC: 105 MMOL/L — SIGNIFICANT CHANGE UP (ref 98–107)
CHLORIDE SERPL-SCNC: 105 MMOL/L — SIGNIFICANT CHANGE UP (ref 98–107)
CO2 SERPL-SCNC: 22 MMOL/L — SIGNIFICANT CHANGE UP (ref 22–31)
CO2 SERPL-SCNC: 24 MMOL/L — SIGNIFICANT CHANGE UP (ref 22–31)
CREAT SERPL-MCNC: 0.84 MG/DL — SIGNIFICANT CHANGE UP (ref 0.5–1.3)
CREAT SERPL-MCNC: 0.86 MG/DL — SIGNIFICANT CHANGE UP (ref 0.5–1.3)
GLUCOSE SERPL-MCNC: 112 MG/DL — HIGH (ref 70–99)
GLUCOSE SERPL-MCNC: 117 MG/DL — HIGH (ref 70–99)
HCT VFR BLD CALC: 22.5 % — LOW (ref 39–50)
HCT VFR BLD CALC: 24.1 % — LOW (ref 39–50)
HGB BLD-MCNC: 7.5 G/DL — LOW (ref 13–17)
HGB BLD-MCNC: 7.7 G/DL — LOW (ref 13–17)
MAGNESIUM SERPL-MCNC: 2.1 MG/DL — SIGNIFICANT CHANGE UP (ref 1.6–2.6)
MCHC RBC-ENTMCNC: 25 PG — LOW (ref 27–34)
MCHC RBC-ENTMCNC: 26 PG — LOW (ref 27–34)
MCHC RBC-ENTMCNC: 32 % — SIGNIFICANT CHANGE UP (ref 32–36)
MCHC RBC-ENTMCNC: 33.3 % — SIGNIFICANT CHANGE UP (ref 32–36)
MCV RBC AUTO: 78.1 FL — LOW (ref 80–100)
MCV RBC AUTO: 78.2 FL — LOW (ref 80–100)
NRBC # FLD: 0 K/UL — LOW (ref 25–125)
NRBC # FLD: 0 K/UL — LOW (ref 25–125)
PHOSPHATE SERPL-MCNC: 2.9 MG/DL — SIGNIFICANT CHANGE UP (ref 2.5–4.5)
PLATELET # BLD AUTO: 254 K/UL — SIGNIFICANT CHANGE UP (ref 150–400)
PLATELET # BLD AUTO: 260 K/UL — SIGNIFICANT CHANGE UP (ref 150–400)
PMV BLD: 10 FL — SIGNIFICANT CHANGE UP (ref 7–13)
PMV BLD: 9.5 FL — SIGNIFICANT CHANGE UP (ref 7–13)
POTASSIUM SERPL-MCNC: 3.8 MMOL/L — SIGNIFICANT CHANGE UP (ref 3.5–5.3)
POTASSIUM SERPL-MCNC: 4.3 MMOL/L — SIGNIFICANT CHANGE UP (ref 3.5–5.3)
POTASSIUM SERPL-SCNC: 3.8 MMOL/L — SIGNIFICANT CHANGE UP (ref 3.5–5.3)
POTASSIUM SERPL-SCNC: 4.3 MMOL/L — SIGNIFICANT CHANGE UP (ref 3.5–5.3)
RBC # BLD: 2.88 M/UL — LOW (ref 4.2–5.8)
RBC # BLD: 3.08 M/UL — LOW (ref 4.2–5.8)
RBC # FLD: 21.7 % — HIGH (ref 10.3–14.5)
RBC # FLD: 21.8 % — HIGH (ref 10.3–14.5)
SODIUM SERPL-SCNC: 139 MMOL/L — SIGNIFICANT CHANGE UP (ref 135–145)
SODIUM SERPL-SCNC: 140 MMOL/L — SIGNIFICANT CHANGE UP (ref 135–145)
SURGICAL PATHOLOGY STUDY: SIGNIFICANT CHANGE UP
TROPONIN T, HIGH SENSITIVITY: < 6 NG/L — SIGNIFICANT CHANGE UP (ref ?–14)
TROPONIN T, HIGH SENSITIVITY: < 6 NG/L — SIGNIFICANT CHANGE UP (ref ?–14)
WBC # BLD: 13.7 K/UL — HIGH (ref 3.8–10.5)
WBC # BLD: 15.06 K/UL — HIGH (ref 3.8–10.5)
WBC # FLD AUTO: 13.7 K/UL — HIGH (ref 3.8–10.5)
WBC # FLD AUTO: 15.06 K/UL — HIGH (ref 3.8–10.5)

## 2019-01-25 PROCEDURE — 99232 SBSQ HOSP IP/OBS MODERATE 35: CPT | Mod: GC

## 2019-01-25 PROCEDURE — 71045 X-RAY EXAM CHEST 1 VIEW: CPT | Mod: 26

## 2019-01-25 RX ORDER — ACETAMINOPHEN 500 MG
1000 TABLET ORAL ONCE
Qty: 0 | Refills: 0 | Status: COMPLETED | OUTPATIENT
Start: 2019-01-25 | End: 2019-01-25

## 2019-01-25 RX ORDER — ONDANSETRON 8 MG/1
4 TABLET, FILM COATED ORAL ONCE
Qty: 0 | Refills: 0 | Status: COMPLETED | OUTPATIENT
Start: 2019-01-25 | End: 2019-01-25

## 2019-01-25 RX ADMIN — Medication 400 MILLIGRAM(S): at 01:09

## 2019-01-25 RX ADMIN — SODIUM CHLORIDE 110 MILLILITER(S): 9 INJECTION, SOLUTION INTRAVENOUS at 11:24

## 2019-01-25 RX ADMIN — PANTOPRAZOLE SODIUM 40 MILLIGRAM(S): 20 TABLET, DELAYED RELEASE ORAL at 11:23

## 2019-01-25 RX ADMIN — ONDANSETRON 4 MILLIGRAM(S): 8 TABLET, FILM COATED ORAL at 09:05

## 2019-01-25 RX ADMIN — Medication 1000 MILLIGRAM(S): at 01:39

## 2019-01-25 NOTE — PROGRESS NOTE ADULT - SUBJECTIVE AND OBJECTIVE BOX
General Surgery Progress Note     Subjective: Patient was seen and examined during morning rounds. Overnight, c/o CP with left arm numbness, cardiac workup was negative. Also had mild blood tinged bowel movements as per RN and father. Having BM. Passing flatus. Pt denies f/c, n/v, SOB, CP/ABP, lightheadedness.        Objective:    Physical Exam:  Gen: NAD, awake and alert  Head/Neck: NC/AT  Resp: CTABL, nonlabored  Abdomen: soft, minimal tenderness, mild distention, no guarding or rebounding tenderness   Extremeties: no edema, gross sensation intact, gross motor function intact    Vital Signs Last 24 Hrs  T(C): 37.6 (25 Jan 2019 13:33), Max: 37.6 (25 Jan 2019 05:59)  T(F): 99.6 (25 Jan 2019 13:33), Max: 99.7 (25 Jan 2019 05:59)  HR: 82 (25 Jan 2019 13:33) (76 - 94)  BP: 118/68 (25 Jan 2019 13:33) (117/68 - 148/88)  BP(mean): --  RR: 14 (25 Jan 2019 13:33) (14 - 20)  SpO2: 100% (25 Jan 2019 13:33) (99% - 100%)    01-24-19 @ 07:01 - 01-25-19 @ 07:00  --------------------------------------------------------  IN: 1860 mL / OUT: 3750 mL / NET: -1890 mL    01-25-19 @ 07:01 - 01-25-19 @ 15:20  --------------------------------------------------------  IN: 880 mL / OUT: 550 mL / NET: 330 mL      MEDICATIONS  (STANDING):  lactated ringers. 1000 milliLiter(s) (110 mL/Hr) IV Continuous <Continuous>  pantoprazole  Injectable 40 milliGRAM(s) IV Push daily    MEDICATIONS  (PRN):  ALBUTerol    0.083%. 2.5 milliGRAM(s) Nebulizer once PRN Shortness of Breath and/or Wheezing      CBC (01-25 @ 04:40)                              7.5<L>                         13.70<H>  )----------------(  254        --    % Neutrophils, --    % Lymphocytes, ANC: --                                  22.5<L>              CBC (01-25 @ 00:40)                              7.7<L>                         15.06<H>  )----------------(  260        --    % Neutrophils, --    % Lymphocytes, ANC: --                                  24.1<L>                BMP (01-25 @ 04:40)             140     |  105     |  15    		Ca++ --      Ca 8.4                ---------------------------------( 112<H>		Mg 2.1                4.3     |  24      |  0.86  			Ph 2.9     BMP (01-25 @ 00:40)             139     |  105     |  15    		Ca++ --      Ca 8.4                ---------------------------------( 117<H>		Mg --                 3.8     |  22      |  0.84  			Ph --

## 2019-01-25 NOTE — PROGRESS NOTE ADULT - SUBJECTIVE AND OBJECTIVE BOX
INTERVAL HPI/OVERNIGHT EVENTS:  Patient S&E at bedside. No o/n events, patient resting comfortably. No complaints at this time. Patient denies fever, chills, dizziness, weakness, CP, palpitations, SOB, cough, N/V/D/C, dysuria, changes in bowel movements, LE edema.    VITAL SIGNS:  T(F): 98 (01-25-19 @ 09:53)  HR: 89 (01-25-19 @ 09:53)  BP: 145/71 (01-25-19 @ 09:53)  RR: 18 (01-25-19 @ 09:53)  SpO2: 99% (01-25-19 @ 09:53)  Wt(kg): --    PHYSICAL EXAM:    Constitutional: WDWN, NAD,   Eyes: PERRL, EOMI, sclera non-icteric  Neck: supple, no masses, no JVD  Respiratory: CTA b/l, good air entry b/l, no wheezing, rhonchi, rales, with normal respiratory effort and no intercostal retractions  Cardiovascular: RRR, normal S1S2, no M/R/G  Gastrointestinal: soft, NTND, no masses palpable, BS normal in all four quadrants, no HSM  Extremities: WWP, no c/c/e  Neurological: AAOx3  Skin: Normal temperature    MEDICATIONS  (STANDING):  lactated ringers. 1000 milliLiter(s) (110 mL/Hr) IV Continuous <Continuous>  pantoprazole  Injectable 40 milliGRAM(s) IV Push daily    MEDICATIONS  (PRN):  ALBUTerol    0.083%. 2.5 milliGRAM(s) Nebulizer once PRN Shortness of Breath and/or Wheezing      Allergies    No Known Allergies    Intolerances        LABS:                        7.5    13.70 )-----------( 254      ( 25 Jan 2019 04:40 )             22.5     01-25    140  |  105  |  15  ----------------------------<  112<H>  4.3   |  24  |  0.86    Ca    8.4      25 Jan 2019 04:40  Phos  2.9     01-25  Mg     2.1     01-25            RADIOLOGY & ADDITIONAL TESTS:  Studies reviewed. INTERVAL HPI/OVERNIGHT EVENTS:  Patient S&E at bedside.   No o/n events, patient resting comfortably. No complaints at this time. Overall feels better.    VITAL SIGNS:  T(F): 98 (01-25-19 @ 09:53)  HR: 89 (01-25-19 @ 09:53)  BP: 145/71 (01-25-19 @ 09:53)  RR: 18 (01-25-19 @ 09:53)  SpO2: 99% (01-25-19 @ 09:53)  Wt(kg): --    PHYSICAL EXAM:    Constitutional: WDWN, NAD,   Eyes: PERRL, EOMI, sclera non-icteric  Neck: supple, no masses, no JVD  Respiratory: CTA b/l, good air entry b/l  Cardiovascular: RRR, normal S1S2, no M/R/G  Gastrointestinal: soft, BS present  Extremities: WWP, no c/c/e  Neurological: AAOx3  Skin: Normal temperature    MEDICATIONS  (STANDING):  lactated ringers. 1000 milliLiter(s) (110 mL/Hr) IV Continuous <Continuous>  pantoprazole  Injectable 40 milliGRAM(s) IV Push daily    MEDICATIONS  (PRN):  ALBUTerol    0.083%. 2.5 milliGRAM(s) Nebulizer once PRN Shortness of Breath and/or Wheezing      Allergies    No Known Allergies    Intolerances        LABS:                        7.5    13.70 )-----------( 254      ( 25 Jan 2019 04:40 )             22.5     01-25    140  |  105  |  15  ----------------------------<  112<H>  4.3   |  24  |  0.86    Ca    8.4      25 Jan 2019 04:40  Phos  2.9     01-25  Mg     2.1     01-25            RADIOLOGY & ADDITIONAL TESTS:  Studies reviewed.

## 2019-01-26 LAB
ANION GAP SERPL CALC-SCNC: 11 MMO/L — SIGNIFICANT CHANGE UP (ref 7–14)
BUN SERPL-MCNC: 14 MG/DL — SIGNIFICANT CHANGE UP (ref 7–23)
CALCIUM SERPL-MCNC: 8.9 MG/DL — SIGNIFICANT CHANGE UP (ref 8.4–10.5)
CHLORIDE SERPL-SCNC: 102 MMOL/L — SIGNIFICANT CHANGE UP (ref 98–107)
CO2 SERPL-SCNC: 24 MMOL/L — SIGNIFICANT CHANGE UP (ref 22–31)
CREAT SERPL-MCNC: 0.81 MG/DL — SIGNIFICANT CHANGE UP (ref 0.5–1.3)
GLUCOSE SERPL-MCNC: 86 MG/DL — SIGNIFICANT CHANGE UP (ref 70–99)
HCT VFR BLD CALC: 22.5 % — LOW (ref 39–50)
HGB BLD-MCNC: 7.1 G/DL — LOW (ref 13–17)
MAGNESIUM SERPL-MCNC: 2 MG/DL — SIGNIFICANT CHANGE UP (ref 1.6–2.6)
MCHC RBC-ENTMCNC: 25 PG — LOW (ref 27–34)
MCHC RBC-ENTMCNC: 31.6 % — LOW (ref 32–36)
MCV RBC AUTO: 79.2 FL — LOW (ref 80–100)
NRBC # FLD: 0 K/UL — LOW (ref 25–125)
PHOSPHATE SERPL-MCNC: 2.7 MG/DL — SIGNIFICANT CHANGE UP (ref 2.5–4.5)
PLATELET # BLD AUTO: 247 K/UL — SIGNIFICANT CHANGE UP (ref 150–400)
PMV BLD: 10 FL — SIGNIFICANT CHANGE UP (ref 7–13)
POTASSIUM SERPL-MCNC: 3.9 MMOL/L — SIGNIFICANT CHANGE UP (ref 3.5–5.3)
POTASSIUM SERPL-SCNC: 3.9 MMOL/L — SIGNIFICANT CHANGE UP (ref 3.5–5.3)
RBC # BLD: 2.84 M/UL — LOW (ref 4.2–5.8)
RBC # FLD: 22.6 % — HIGH (ref 10.3–14.5)
SODIUM SERPL-SCNC: 137 MMOL/L — SIGNIFICANT CHANGE UP (ref 135–145)
WBC # BLD: 9.77 K/UL — SIGNIFICANT CHANGE UP (ref 3.8–10.5)
WBC # FLD AUTO: 9.77 K/UL — SIGNIFICANT CHANGE UP (ref 3.8–10.5)

## 2019-01-26 RX ORDER — ACETAMINOPHEN 500 MG
1000 TABLET ORAL ONCE
Qty: 0 | Refills: 0 | Status: COMPLETED | OUTPATIENT
Start: 2019-01-26 | End: 2019-01-26

## 2019-01-26 RX ORDER — PANTOPRAZOLE SODIUM 20 MG/1
40 TABLET, DELAYED RELEASE ORAL
Qty: 0 | Refills: 0 | Status: DISCONTINUED | OUTPATIENT
Start: 2019-01-26 | End: 2019-01-27

## 2019-01-26 RX ORDER — DEXTROSE MONOHYDRATE, SODIUM CHLORIDE, AND POTASSIUM CHLORIDE 50; .745; 4.5 G/1000ML; G/1000ML; G/1000ML
1000 INJECTION, SOLUTION INTRAVENOUS
Qty: 0 | Refills: 0 | Status: DISCONTINUED | OUTPATIENT
Start: 2019-01-26 | End: 2019-01-29

## 2019-01-26 RX ORDER — POTASSIUM PHOSPHATE, MONOBASIC POTASSIUM PHOSPHATE, DIBASIC 236; 224 MG/ML; MG/ML
15 INJECTION, SOLUTION INTRAVENOUS ONCE
Qty: 0 | Refills: 0 | Status: COMPLETED | OUTPATIENT
Start: 2019-01-26 | End: 2019-01-26

## 2019-01-26 RX ORDER — ENOXAPARIN SODIUM 100 MG/ML
40 INJECTION SUBCUTANEOUS DAILY
Qty: 0 | Refills: 0 | Status: DISCONTINUED | OUTPATIENT
Start: 2019-01-26 | End: 2019-01-30

## 2019-01-26 RX ADMIN — Medication 1000 MILLIGRAM(S): at 02:28

## 2019-01-26 RX ADMIN — Medication 400 MILLIGRAM(S): at 01:58

## 2019-01-26 RX ADMIN — PANTOPRAZOLE SODIUM 40 MILLIGRAM(S): 20 TABLET, DELAYED RELEASE ORAL at 13:48

## 2019-01-26 RX ADMIN — Medication 400 MILLIGRAM(S): at 23:25

## 2019-01-26 RX ADMIN — Medication 1000 MILLIGRAM(S): at 23:55

## 2019-01-26 RX ADMIN — POTASSIUM PHOSPHATE, MONOBASIC POTASSIUM PHOSPHATE, DIBASIC 62.5 MILLIMOLE(S): 236; 224 INJECTION, SOLUTION INTRAVENOUS at 10:49

## 2019-01-26 RX ADMIN — DEXTROSE MONOHYDRATE, SODIUM CHLORIDE, AND POTASSIUM CHLORIDE 50 MILLILITER(S): 50; .745; 4.5 INJECTION, SOLUTION INTRAVENOUS at 13:49

## 2019-01-26 RX ADMIN — ENOXAPARIN SODIUM 40 MILLIGRAM(S): 100 INJECTION SUBCUTANEOUS at 17:33

## 2019-01-26 NOTE — PROGRESS NOTE ADULT - SUBJECTIVE AND OBJECTIVE BOX
S: Patient seen and examined.  No acute events overnight.  Pain controlled.  Reports having passed flatus.  Has yet to have a bowel movement. No further episodes of bleeding    O: Vital Signs  T(C): 36.9 (01-26 @ 08:36), Max: 37.6 (01-25 @ 13:33)  HR: 78 (01-26 @ 08:36) (73 - 86)  BP: 127/82 (01-26 @ 08:36) (113/68 - 132/75)  RR: 18 (01-26 @ 08:36) (14 - 18)  SpO2: 99% (01-26 @ 08:36) (99% - 100%)  01-25-19 @ 07:01  -  01-26-19 @ 07:00  --------------------------------------------------------  IN: 2740 mL / OUT: 3200 mL / NET: -460 mL    01-26-19 @ 07:01  -  01-26-19 @ 10:00  --------------------------------------------------------  IN: 0 mL / OUT: 700 mL / NET: -700 mL      Physical Exam:  Gen: NAD, awake and alert  Head/Neck: NC/AT  Resp: CTABL, nonlabored  Abdomen: soft, minimal tenderness, mild distention, no guarding or rebounding tenderness   Extremeties: no edema, gross sensation intact, gross motor function intact                          7.1    9.77  )-----------( 247      ( 26 Jan 2019 06:30 )             22.5   01-26    137  |  102  |  14  ----------------------------<  86  3.9   |  24  |  0.81    Ca    8.9      26 Jan 2019 06:30  Phos  2.7     01-26  Mg     2.0     01-26

## 2019-01-27 LAB
ANION GAP SERPL CALC-SCNC: 14 MMO/L — SIGNIFICANT CHANGE UP (ref 7–14)
BUN SERPL-MCNC: 13 MG/DL — SIGNIFICANT CHANGE UP (ref 7–23)
CALCIUM SERPL-MCNC: 9.5 MG/DL — SIGNIFICANT CHANGE UP (ref 8.4–10.5)
CHLORIDE SERPL-SCNC: 99 MMOL/L — SIGNIFICANT CHANGE UP (ref 98–107)
CO2 SERPL-SCNC: 23 MMOL/L — SIGNIFICANT CHANGE UP (ref 22–31)
CREAT SERPL-MCNC: 0.79 MG/DL — SIGNIFICANT CHANGE UP (ref 0.5–1.3)
GLUCOSE SERPL-MCNC: 111 MG/DL — HIGH (ref 70–99)
HCT VFR BLD CALC: 23.6 % — LOW (ref 39–50)
HCT VFR BLD CALC: 25.2 % — LOW (ref 39–50)
HGB BLD-MCNC: 7.8 G/DL — LOW (ref 13–17)
HGB BLD-MCNC: 8.3 G/DL — LOW (ref 13–17)
MAGNESIUM SERPL-MCNC: 2 MG/DL — SIGNIFICANT CHANGE UP (ref 1.6–2.6)
MCHC RBC-ENTMCNC: 25.6 PG — LOW (ref 27–34)
MCHC RBC-ENTMCNC: 25.8 PG — LOW (ref 27–34)
MCHC RBC-ENTMCNC: 32.9 % — SIGNIFICANT CHANGE UP (ref 32–36)
MCHC RBC-ENTMCNC: 33.1 % — SIGNIFICANT CHANGE UP (ref 32–36)
MCV RBC AUTO: 77.4 FL — LOW (ref 80–100)
MCV RBC AUTO: 78.3 FL — LOW (ref 80–100)
NRBC # FLD: 0 K/UL — LOW (ref 25–125)
NRBC # FLD: 0 K/UL — LOW (ref 25–125)
PHOSPHATE SERPL-MCNC: 3.6 MG/DL — SIGNIFICANT CHANGE UP (ref 2.5–4.5)
PLATELET # BLD AUTO: 285 K/UL — SIGNIFICANT CHANGE UP (ref 150–400)
PLATELET # BLD AUTO: 316 K/UL — SIGNIFICANT CHANGE UP (ref 150–400)
PMV BLD: 10 FL — SIGNIFICANT CHANGE UP (ref 7–13)
PMV BLD: 9.7 FL — SIGNIFICANT CHANGE UP (ref 7–13)
POTASSIUM SERPL-MCNC: 3.9 MMOL/L — SIGNIFICANT CHANGE UP (ref 3.5–5.3)
POTASSIUM SERPL-SCNC: 3.9 MMOL/L — SIGNIFICANT CHANGE UP (ref 3.5–5.3)
RBC # BLD: 3.05 M/UL — LOW (ref 4.2–5.8)
RBC # BLD: 3.22 M/UL — LOW (ref 4.2–5.8)
RBC # FLD: 22.5 % — HIGH (ref 10.3–14.5)
RBC # FLD: 23 % — HIGH (ref 10.3–14.5)
SODIUM SERPL-SCNC: 136 MMOL/L — SIGNIFICANT CHANGE UP (ref 135–145)
WBC # BLD: 6.48 K/UL — SIGNIFICANT CHANGE UP (ref 3.8–10.5)
WBC # BLD: 7.37 K/UL — SIGNIFICANT CHANGE UP (ref 3.8–10.5)
WBC # FLD AUTO: 6.48 K/UL — SIGNIFICANT CHANGE UP (ref 3.8–10.5)
WBC # FLD AUTO: 7.37 K/UL — SIGNIFICANT CHANGE UP (ref 3.8–10.5)

## 2019-01-27 PROCEDURE — 74018 RADEX ABDOMEN 1 VIEW: CPT | Mod: 26

## 2019-01-27 RX ORDER — POTASSIUM CHLORIDE 20 MEQ
10 PACKET (EA) ORAL DAILY
Qty: 0 | Refills: 0 | Status: DISCONTINUED | OUTPATIENT
Start: 2019-01-27 | End: 2019-01-27

## 2019-01-27 RX ORDER — PANTOPRAZOLE SODIUM 20 MG/1
40 TABLET, DELAYED RELEASE ORAL DAILY
Qty: 0 | Refills: 0 | Status: DISCONTINUED | OUTPATIENT
Start: 2019-01-27 | End: 2019-01-29

## 2019-01-27 RX ORDER — ONDANSETRON 8 MG/1
4 TABLET, FILM COATED ORAL ONCE
Qty: 0 | Refills: 0 | Status: COMPLETED | OUTPATIENT
Start: 2019-01-27 | End: 2019-01-27

## 2019-01-27 RX ORDER — POTASSIUM CHLORIDE 20 MEQ
10 PACKET (EA) ORAL ONCE
Qty: 0 | Refills: 0 | Status: COMPLETED | OUTPATIENT
Start: 2019-01-27 | End: 2019-01-27

## 2019-01-27 RX ADMIN — PANTOPRAZOLE SODIUM 40 MILLIGRAM(S): 20 TABLET, DELAYED RELEASE ORAL at 13:39

## 2019-01-27 RX ADMIN — ONDANSETRON 4 MILLIGRAM(S): 8 TABLET, FILM COATED ORAL at 05:28

## 2019-01-27 RX ADMIN — ENOXAPARIN SODIUM 40 MILLIGRAM(S): 100 INJECTION SUBCUTANEOUS at 13:39

## 2019-01-27 RX ADMIN — Medication 100 MILLIEQUIVALENT(S): at 14:40

## 2019-01-27 NOTE — PROVIDER CONTACT NOTE (OTHER) - ACTION/TREATMENT ORDERED:
md made aware, said she will get consent after morning rounds, will let me know when consent is done so blood can be started, will continue to monitor
md made aware, said will fill it out, will wait and administer bloods
Continue to monitor, EKG, begin 2nd unit PRBC.
NG tube to be placed by team
PA requested that we continue to monitor.
Will cont to monitor
continue to monitor, continue transfusions
md made aware, bmp and cbc ordered stat, md will come to bedside
md made aware, no new orders, will come assess patient, will continue to monitor
md made aware, will assess patient, will continue to monitor
md made aware, zofran ordered, will continue to monitor
place winkler.

## 2019-01-27 NOTE — PROVIDER CONTACT NOTE (OTHER) - RECOMMENDATIONS
md notify
please come fill out consent
CBC?
Continue to closely monitor, SICU consult?
Continue to monitor, maintain transfusion
Fever workup?
NG tube
md notify
md notify
md notify, please come assess asap
place winkler
zofran

## 2019-01-27 NOTE — PROVIDER CONTACT NOTE (OTHER) - ASSESSMENT
hgb 7.2
lethargic, pale, resting in bed
Bright red blood per rectum
Pt unable to void with attempts made during shift. Bladder scan > 546 in bladder.
Pt with increasing lethargy prior to 2nd blood transfusion, clinical impact nurse at bedside for 2nd unit of PRBC. Tachycardia continues. Pale in color. Family at bedside.
Pt with ongoing nausea and vomiting. Unable to tolerate clear liquid diet.
Tachycardia and low grade temperatures noted on vitals checks. Blood transfusions for acute anemia ordered and running per EMR and paper chart
feels faint, dizzy, lightheaded, very pale and clammy skin
patient grimacing on toilet, unable to stand up feels weak and lightheaded, feels nauseous, eventually lightheaded passed and patient ambulated to bed but still feels pain even after 1g IV tylenol
pt A7Ox4. VS otherwise stable. denies chills
throwing up
vomitting

## 2019-01-27 NOTE — PROVIDER CONTACT NOTE (OTHER) - SITUATION
pt needs blood transfusion consent
still need consent for blood transfusion
Bright red blood per rectum
Oral temp 100.3
Pt unable to void with attempts made during shift. Bladder scan > 546 in bladder.
Pt with increasing lethargy prior to 2nd blood transfusion, clinical impact nurse at bedside for 2nd unit of PRBC
Pt with increasing tachycardia during shift with blood transfusions running
Pt with ongoing nausea/vomiting
patient vomitting stomach bile, zofran given at 0035
pt has 2 episodes of BRPR - moderately sized blood clot was on bed along with blood when patient stood up. he went to the toilet and proceeded to evacuate pure blood from rectum with no control
pt nauseous, in pain, feels numbness and tingling In hands and shoulders
pt vomitting

## 2019-01-27 NOTE — PROVIDER CONTACT NOTE (OTHER) - REASON
BRBPR
Lethargy
Nausea/Vomiting
Oral temp 100.3
Tachycardia
Urinary Retention
pt nauseous, in pain, feels numbness and tingling In hands and shoulders
pt threw up bile again, pt has 2 episodes of BRPR
pt vomitting
pt vomitting
pt needs blood transfusion consent
still need consent for blood transfusion

## 2019-01-27 NOTE — PROVIDER CONTACT NOTE (OTHER) - NAME OF MD/NP/PA/DO NOTIFIED:
Doris Surg A
Doris Surg A
Doris/ Jose Surg Team A
[facility] [id number]
KASSI Campuzano
Surg A Doris/ Clement
david caputo [id number]
david caputo md [id number]
david caputo [id number]
david caputo [id number]
david caputo md
petar mayo [id number]

## 2019-01-27 NOTE — PROGRESS NOTE ADULT - SUBJECTIVE AND OBJECTIVE BOX
S: Patient seen and examined.  Having episodes of emesis with bloody BMs. Reports having passed flatus.  NGT tube was discussed with patient, and patient refused. The risks associated with refusing an NGT was discussed with the patient.     O:     Physical Exam:  Gen: NAD, awake and alert  Head/Neck: NC/AT  Resp: CTABL, nonlabored  Abdomen: soft, minimal tenderness, mild distention, no guarding or rebounding tenderness   Extremeties: no edema, gross sensation intact, gross motor function intact    Vital Signs Last 24 Hrs  T(C): 36.8 (27 Jan 2019 08:40), Max: 37.7 (27 Jan 2019 06:00)  T(F): 98.3 (27 Jan 2019 08:40), Max: 99.8 (27 Jan 2019 06:00)  HR: 86 (27 Jan 2019 08:40) (81 - 89)  BP: 114/94 (27 Jan 2019 08:40) (114/94 - 136/78)  BP(mean): --  RR: 18 (27 Jan 2019 08:40) (17 - 18)  SpO2: 100% (27 Jan 2019 08:40) (100% - 100%)    01-26-19 @ 07:01  -  01-27-19 @ 07:00  --------------------------------------------------------  IN: 1190 mL / OUT: 3200 mL / NET: -2010 mL      MEDICATIONS  (STANDING):  dextrose 5% + sodium chloride 0.45% with potassium chloride 20 mEq/L 1000 milliLiter(s) (50 mL/Hr) IV Continuous <Continuous>  enoxaparin Injectable 40 milliGRAM(s) SubCutaneous daily  pantoprazole  Injectable 40 milliGRAM(s) IV Push daily    MEDICATIONS  (PRN):  ALBUTerol    0.083%. 2.5 milliGRAM(s) Nebulizer once PRN Shortness of Breath and/or Wheezing    CBC (01-27 @ 07:40)                              8.3<L>                         7.37    )----------------(  316        --    % Neutrophils, --    % Lymphocytes, ANC: --                                  25.2<L>              CBC (01-26 @ 06:30)                              7.1<L>                         9.77    )----------------(  247        --    % Neutrophils, --    % Lymphocytes, ANC: --                                  22.5<L>                BMP (01-27 @ 07:40)             136     |  99      |  13    		Ca++ --      Ca 9.5                ---------------------------------( 111<H>		Mg 2.0                3.9     |  23      |  0.79  			Ph 3.6     BMP (01-26 @ 06:30)             137     |  102     |  14    		Ca++ --      Ca 8.9                ---------------------------------( 86    		Mg 2.0                3.9     |  24      |  0.81  			Ph 2.7

## 2019-01-27 NOTE — PROVIDER CONTACT NOTE (OTHER) - DATE AND TIME:
23-Jan-2019 05:04
23-Jan-2019 07:03
22-Jan-2019 22:00
23-Jan-2019 00:20
23-Jan-2019 01:54
23-Jan-2019 03:14
23-Jan-2019 09:00
23-Jan-2019 09:46
23-Jan-2019 13:43
23-Jan-2019 14:00
25-Jan-2019 16:31
25-Jan-2019 16:31

## 2019-01-27 NOTE — PROVIDER CONTACT NOTE (OTHER) - BACKGROUND
1U PRBC ordered, patient does not have consent for blood transfusion
s/p SBR, hgb dropped to 7.2, need 1 uPRBC
22 yo male with GIB, found to have Meckels now s/p lab SBR 1/22
24 yo male with GIB, found to have Meckels now s/p lab SBR 1/22
s/p small bowel resection
s/p small bowel resection 1/22/19
s/p small bowel resection 1/22/19.

## 2019-01-28 LAB
ANION GAP SERPL CALC-SCNC: 12 MMO/L — SIGNIFICANT CHANGE UP (ref 7–14)
BUN SERPL-MCNC: 13 MG/DL — SIGNIFICANT CHANGE UP (ref 7–23)
CALCIUM SERPL-MCNC: 9.2 MG/DL — SIGNIFICANT CHANGE UP (ref 8.4–10.5)
CHLORIDE SERPL-SCNC: 99 MMOL/L — SIGNIFICANT CHANGE UP (ref 98–107)
CO2 SERPL-SCNC: 26 MMOL/L — SIGNIFICANT CHANGE UP (ref 22–31)
CREAT SERPL-MCNC: 0.78 MG/DL — SIGNIFICANT CHANGE UP (ref 0.5–1.3)
GLUCOSE SERPL-MCNC: 97 MG/DL — SIGNIFICANT CHANGE UP (ref 70–99)
HCT VFR BLD CALC: 23.8 % — LOW (ref 39–50)
HGB BLD-MCNC: 7.5 G/DL — LOW (ref 13–17)
MAGNESIUM SERPL-MCNC: 2.1 MG/DL — SIGNIFICANT CHANGE UP (ref 1.6–2.6)
MCHC RBC-ENTMCNC: 25 PG — LOW (ref 27–34)
MCHC RBC-ENTMCNC: 31.5 % — LOW (ref 32–36)
MCV RBC AUTO: 79.3 FL — LOW (ref 80–100)
NRBC # FLD: 0 K/UL — LOW (ref 25–125)
PHOSPHATE SERPL-MCNC: 3.1 MG/DL — SIGNIFICANT CHANGE UP (ref 2.5–4.5)
PLATELET # BLD AUTO: 324 K/UL — SIGNIFICANT CHANGE UP (ref 150–400)
PMV BLD: 10.8 FL — SIGNIFICANT CHANGE UP (ref 7–13)
POTASSIUM SERPL-MCNC: 4 MMOL/L — SIGNIFICANT CHANGE UP (ref 3.5–5.3)
POTASSIUM SERPL-SCNC: 4 MMOL/L — SIGNIFICANT CHANGE UP (ref 3.5–5.3)
RBC # BLD: 3 M/UL — LOW (ref 4.2–5.8)
RBC # FLD: 22.3 % — HIGH (ref 10.3–14.5)
SODIUM SERPL-SCNC: 137 MMOL/L — SIGNIFICANT CHANGE UP (ref 135–145)
WBC # BLD: 6.52 K/UL — SIGNIFICANT CHANGE UP (ref 3.8–10.5)
WBC # FLD AUTO: 6.52 K/UL — SIGNIFICANT CHANGE UP (ref 3.8–10.5)

## 2019-01-28 RX ORDER — IRON SUCROSE 20 MG/ML
200 INJECTION, SOLUTION INTRAVENOUS
Qty: 0 | Refills: 0 | Status: DISCONTINUED | OUTPATIENT
Start: 2019-01-28 | End: 2019-01-30

## 2019-01-28 RX ADMIN — IRON SUCROSE 110 MILLIGRAM(S): 20 INJECTION, SOLUTION INTRAVENOUS at 18:30

## 2019-01-28 RX ADMIN — PANTOPRAZOLE SODIUM 40 MILLIGRAM(S): 20 TABLET, DELAYED RELEASE ORAL at 12:07

## 2019-01-28 RX ADMIN — ENOXAPARIN SODIUM 40 MILLIGRAM(S): 100 INJECTION SUBCUTANEOUS at 12:08

## 2019-01-28 RX ADMIN — DEXTROSE MONOHYDRATE, SODIUM CHLORIDE, AND POTASSIUM CHLORIDE 50 MILLILITER(S): 50; .745; 4.5 INJECTION, SOLUTION INTRAVENOUS at 09:07

## 2019-01-28 NOTE — PROGRESS NOTE ADULT - SUBJECTIVE AND OBJECTIVE BOX
General Surgery Progress Note     Subjective: Patient was seen and examined during morning rounds. Yesterday afternoon, had 2-3 episodes of emesis of clear fluid. Patient was made NPO yesterday evening. No episodes of emesis overnight. Had one dark bowel movement this morning. Patient reports improvement in nausea. Denies flatus. Pt denies f/c, n/v, SOB, CP lightheadedness.       Objective:    Physical Exam:  Gen: NAD, awake and alert  Head/Neck: NC/AT  Resp: CTABL, nonlabored  Abdomen: soft, minimal tenderness, mild distention, no guarding or rebounding tenderness   Extremeties: no edema, gross sensation intact, gross motor function intact      MEDICATIONS  (STANDING):  dextrose 5% + sodium chloride 0.45% with potassium chloride 20 mEq/L 1000 milliLiter(s) (50 mL/Hr) IV Continuous <Continuous>  enoxaparin Injectable 40 milliGRAM(s) SubCutaneous daily  pantoprazole  Injectable 40 milliGRAM(s) IV Push daily    MEDICATIONS  (PRN):  ALBUTerol    0.083%. 2.5 milliGRAM(s) Nebulizer once PRN Shortness of Breath and/or Wheezing      Vital Signs Last 24 Hrs  T(C): 37.2 (28 Jan 2019 05:09), Max: 37.9 (27 Jan 2019 21:30)  T(F): 98.9 (28 Jan 2019 05:09), Max: 100.3 (27 Jan 2019 21:30)  HR: 73 (28 Jan 2019 05:09) (69 - 88)  BP: 118/62 (28 Jan 2019 05:09) (114/94 - 129/65)  BP(mean): --  RR: 18 (28 Jan 2019 05:09) (16 - 18)  SpO2: 99% (28 Jan 2019 05:09) (99% - 100%)      01-26-19 @ 07:01  -  01-27-19 @ 07:00  --------------------------------------------------------  IN: 1590 mL / OUT: 3200 mL / NET: -1610 mL    01-27-19 @ 07:01  -  01-28-19 @ 06:27  --------------------------------------------------------  IN: 1300 mL / OUT: 1350 mL / NET: -50 mL        LABS:                        7.8    6.48  )-----------( 285      ( 27 Jan 2019 12:14 )             23.6     01-27    136  |  99  |  13  ----------------------------<  111<H>  3.9   |  23  |  0.79    Ca    9.5      27 Jan 2019 07:40  Phos  3.6     01-27  Mg     2.0     01-27

## 2019-01-29 ENCOUNTER — TRANSCRIPTION ENCOUNTER (OUTPATIENT)
Age: 24
End: 2019-01-29

## 2019-01-29 LAB
ANION GAP SERPL CALC-SCNC: 16 MMO/L — HIGH (ref 7–14)
BUN SERPL-MCNC: 12 MG/DL — SIGNIFICANT CHANGE UP (ref 7–23)
CALCIUM SERPL-MCNC: 9.2 MG/DL — SIGNIFICANT CHANGE UP (ref 8.4–10.5)
CHLORIDE SERPL-SCNC: 98 MMOL/L — SIGNIFICANT CHANGE UP (ref 98–107)
CO2 SERPL-SCNC: 24 MMOL/L — SIGNIFICANT CHANGE UP (ref 22–31)
CREAT SERPL-MCNC: 0.88 MG/DL — SIGNIFICANT CHANGE UP (ref 0.5–1.3)
GLUCOSE SERPL-MCNC: 88 MG/DL — SIGNIFICANT CHANGE UP (ref 70–99)
HCT VFR BLD CALC: 22.1 % — LOW (ref 39–50)
HGB BLD-MCNC: 7.2 G/DL — LOW (ref 13–17)
MAGNESIUM SERPL-MCNC: 2 MG/DL — SIGNIFICANT CHANGE UP (ref 1.6–2.6)
MCHC RBC-ENTMCNC: 25.3 PG — LOW (ref 27–34)
MCHC RBC-ENTMCNC: 32.6 % — SIGNIFICANT CHANGE UP (ref 32–36)
MCV RBC AUTO: 77.5 FL — LOW (ref 80–100)
NRBC # FLD: 0 K/UL — LOW (ref 25–125)
PHOSPHATE SERPL-MCNC: 3.9 MG/DL — SIGNIFICANT CHANGE UP (ref 2.5–4.5)
PLATELET # BLD AUTO: 302 K/UL — SIGNIFICANT CHANGE UP (ref 150–400)
PMV BLD: 10.5 FL — SIGNIFICANT CHANGE UP (ref 7–13)
POTASSIUM SERPL-MCNC: 3.8 MMOL/L — SIGNIFICANT CHANGE UP (ref 3.5–5.3)
POTASSIUM SERPL-SCNC: 3.8 MMOL/L — SIGNIFICANT CHANGE UP (ref 3.5–5.3)
RBC # BLD: 2.85 M/UL — LOW (ref 4.2–5.8)
RBC # FLD: 21.7 % — HIGH (ref 10.3–14.5)
SODIUM SERPL-SCNC: 138 MMOL/L — SIGNIFICANT CHANGE UP (ref 135–145)
WBC # BLD: 6.54 K/UL — SIGNIFICANT CHANGE UP (ref 3.8–10.5)
WBC # FLD AUTO: 6.54 K/UL — SIGNIFICANT CHANGE UP (ref 3.8–10.5)

## 2019-01-29 RX ORDER — ACETAMINOPHEN 500 MG
650 TABLET ORAL EVERY 6 HOURS
Qty: 0 | Refills: 0 | Status: DISCONTINUED | OUTPATIENT
Start: 2019-01-29 | End: 2019-01-30

## 2019-01-29 RX ORDER — FERROUS SULFATE 325(65) MG
1 TABLET ORAL
Qty: 30 | Refills: 0 | OUTPATIENT
Start: 2019-01-29 | End: 2019-02-27

## 2019-01-29 RX ORDER — ACETAMINOPHEN 500 MG
650 TABLET ORAL ONCE
Qty: 0 | Refills: 0 | Status: COMPLETED | OUTPATIENT
Start: 2019-01-29 | End: 2019-01-29

## 2019-01-29 RX ORDER — ASCORBIC ACID 60 MG
1 TABLET,CHEWABLE ORAL
Qty: 30 | Refills: 0 | OUTPATIENT
Start: 2019-01-29 | End: 2019-02-27

## 2019-01-29 RX ORDER — PANTOPRAZOLE SODIUM 20 MG/1
40 TABLET, DELAYED RELEASE ORAL
Qty: 0 | Refills: 0 | Status: DISCONTINUED | OUTPATIENT
Start: 2019-01-29 | End: 2019-01-30

## 2019-01-29 RX ORDER — LANOLIN ALCOHOL/MO/W.PET/CERES
3 CREAM (GRAM) TOPICAL ONCE
Qty: 0 | Refills: 0 | Status: DISCONTINUED | OUTPATIENT
Start: 2019-01-29 | End: 2019-01-30

## 2019-01-29 RX ADMIN — Medication 650 MILLIGRAM(S): at 01:43

## 2019-01-29 RX ADMIN — ENOXAPARIN SODIUM 40 MILLIGRAM(S): 100 INJECTION SUBCUTANEOUS at 13:37

## 2019-01-29 RX ADMIN — Medication 650 MILLIGRAM(S): at 00:56

## 2019-01-29 RX ADMIN — Medication 650 MILLIGRAM(S): at 21:23

## 2019-01-29 RX ADMIN — Medication 650 MILLIGRAM(S): at 21:53

## 2019-01-29 RX ADMIN — PANTOPRAZOLE SODIUM 40 MILLIGRAM(S): 20 TABLET, DELAYED RELEASE ORAL at 18:52

## 2019-01-29 NOTE — DISCHARGE NOTE ADULT - MEDICATION SUMMARY - MEDICATIONS TO TAKE
I will START or STAY ON the medications listed below when I get home from the hospital:    acetaminophen 325 mg oral tablet  -- 2 tab(s) by mouth every 6 hours, As needed, Mild Pain (1 - 3)  -- Indication: For PAIN    Ventolin 90 mcg/inh inhalation aerosol with adapter  -- inhaled every 4 hours, As Needed  -- Indication: For inhaler    ferrous sulfate 325 mg (65 mg elemental iron) oral delayed release tablet  -- 1 tab(s) by mouth once a day   -- May discolor urine or feces.  Swallow whole.  Do not crush.    -- Indication: For anemia    pantoprazole 40 mg oral delayed release tablet  -- 1 tab(s) by mouth once a day (before a meal)  -- Indication: For GERD    ascorbic acid 500 mg oral tablet  -- 1 tab(s) by mouth once a day   -- Indication: For anemia    Vitamin D3 1000 intl units oral tablet  -- 1 tab(s) by mouth once a day  -- Indication: For vitamin

## 2019-01-29 NOTE — DISCHARGE NOTE ADULT - INSTRUCTIONS
LRD Report of any redness, drainage, swelling, chills, fever or pain not relieved by pain medication. Report increased abdominal distension, nausea, vomiting, diarrhea, bloody stools to the provider.

## 2019-01-29 NOTE — DIETITIAN INITIAL EVALUATION ADULT. - NS AS NUTRI INTERV ED CONTENT3
Low fiber handout provided. Pt instructed to avoid fresh fruits/ vegetables, limit whole grains, avoid high fat, fried foods. Continue low fat diet until BMs resolve and reintroduce slowly.

## 2019-01-29 NOTE — DISCHARGE NOTE ADULT - PLAN OF CARE
s/p small bowel resection WOUND CARE:  Please keep incisions clean and dry. Please do not Scrub or rub incisions. Do not use lotion or powder on incisions.   BATHING: You may shower and/or sponge bathe. You may use warm soapy water in the shower and rinse, pat dry.  ACTIVITY: No heavy lifting or straining. Otherwise, you may return to your usual level of physical activity. If you are taking narcotic pain medication DO NOT drive a car, operate machinery or make important decisions.  DIET: Return to your usual diet.  NOTIFY YOUR SURGEON IF: You have any bleeding that does not stop, any pus draining from your wound(s), increased pain at surgical site, any fever (over 100.4 F) persistent nausea/vomiting, or if your pain is not controlled on your discharge pain medications.  Please follow up with your primary care physician in 1-2 weeks regarding your hospitalization.  Please follow up with your surgeon, Dr. Crum in 1-2 weeks. Please call office [phone] to make an appointment. WOUND CARE:  Please keep incisions clean and dry. Please do not Scrub or rub incisions. Do not use lotion or powder on incisions.   BATHING: You may shower and/or sponge bathe. You may use warm soapy water in the shower and rinse, pat dry.  ACTIVITY: No heavy lifting or straining. Otherwise, you may return to your usual level of physical activity. If you are taking narcotic pain medication DO NOT drive a car, operate machinery or make important decisions.  DIET: Return to your usual diet.  NOTIFY YOUR SURGEON IF: You have any bleeding that does not stop, any pus draining from your wound(s), increased pain at surgical site, any fever (over 100.4 F) persistent nausea/vomiting, or if your pain is not controlled on your discharge pain medications.  Please follow up with your primary care physician in 1-2 weeks regarding your hospitalization.  Please follow up with your surgeon, Dr. Crum in 1-2 weeks. Please call office [phone] to make an appointment.  Please follow up a the hematology clinic in 1 month. Please call office to make an appointment [phone]

## 2019-01-29 NOTE — DISCHARGE NOTE ADULT - CARE PLAN
Principal Discharge DX:	Meckel's diverticulum  Goal:	s/p small bowel resection  Assessment and plan of treatment:	WOUND CARE:  Please keep incisions clean and dry. Please do not Scrub or rub incisions. Do not use lotion or powder on incisions.   BATHING: You may shower and/or sponge bathe. You may use warm soapy water in the shower and rinse, pat dry.  ACTIVITY: No heavy lifting or straining. Otherwise, you may return to your usual level of physical activity. If you are taking narcotic pain medication DO NOT drive a car, operate machinery or make important decisions.  DIET: Return to your usual diet.  NOTIFY YOUR SURGEON IF: You have any bleeding that does not stop, any pus draining from your wound(s), increased pain at surgical site, any fever (over 100.4 F) persistent nausea/vomiting, or if your pain is not controlled on your discharge pain medications.  Please follow up with your primary care physician in 1-2 weeks regarding your hospitalization.  Please follow up with your surgeon, Dr. Crum in 1-2 weeks. Please call office [phone] to make an appointment. Principal Discharge DX:	Meckel's diverticulum  Goal:	s/p small bowel resection  Assessment and plan of treatment:	WOUND CARE:  Please keep incisions clean and dry. Please do not Scrub or rub incisions. Do not use lotion or powder on incisions.   BATHING: You may shower and/or sponge bathe. You may use warm soapy water in the shower and rinse, pat dry.  ACTIVITY: No heavy lifting or straining. Otherwise, you may return to your usual level of physical activity. If you are taking narcotic pain medication DO NOT drive a car, operate machinery or make important decisions.  DIET: Return to your usual diet.  NOTIFY YOUR SURGEON IF: You have any bleeding that does not stop, any pus draining from your wound(s), increased pain at surgical site, any fever (over 100.4 F) persistent nausea/vomiting, or if your pain is not controlled on your discharge pain medications.  Please follow up with your primary care physician in 1-2 weeks regarding your hospitalization.  Please follow up with your surgeon, Dr. Crum in 1-2 weeks. Please call office [phone] to make an appointment.  Please follow up a the hematology clinic in 1 month. Please call office to make an appointment [phone]

## 2019-01-29 NOTE — PROGRESS NOTE ADULT - SUBJECTIVE AND OBJECTIVE BOX
General Surgery Progress Note     Subjective: Patient was seen and examined during morning rounds. No episodes of vomiting Has had 3 dark BMs. Passing flatus. Pt denies f/c, n/v, SOB, CP/ABP, lightheadedness. Improving clinically.       Objective:    Physical Exam:  Gen: NAD, awake and alert  Head/Neck: NC/AT  Resp: CTABL, nonlabored  Abdomen: soft, minimal tenderness, mild distention, no guarding or rebounding tenderness, staple line intact   Extremeties: no edema, gross sensation intact, gross motor function intact      MEDICATIONS  (STANDING):  dextrose 5% + sodium chloride 0.45% with potassium chloride 20 mEq/L 1000 milliLiter(s) (50 mL/Hr) IV Continuous <Continuous>  enoxaparin Injectable 40 milliGRAM(s) SubCutaneous daily  iron sucrose IVPB 200 milliGRAM(s) IV Intermittent every 48 hours  pantoprazole  Injectable 40 milliGRAM(s) IV Push daily    MEDICATIONS  (PRN):  ALBUTerol    0.083%. 2.5 milliGRAM(s) Nebulizer once PRN Shortness of Breath and/or Wheezing      Vital Signs Last 24 Hrs  T(C): 37 (29 Jan 2019 05:42), Max: 37.6 (28 Jan 2019 14:00)  T(F): 98.6 (29 Jan 2019 05:42), Max: 99.7 (28 Jan 2019 14:00)  HR: 69 (29 Jan 2019 05:42) (66 - 86)  BP: 118/74 (29 Jan 2019 05:42) (114/66 - 123/64)  BP(mean): --  RR: 18 (29 Jan 2019 05:42) (16 - 18)  SpO2: 100% (29 Jan 2019 05:42) (98% - 100%)      01-28-19 @ 07:01  -  01-29-19 @ 07:00  --------------------------------------------------------  IN: 1200 mL / OUT: 2400 mL / NET: -1200 mL        LABS:                        7.2    6.54  )-----------( 302      ( 29 Jan 2019 05:30 )             22.1     01-29    138  |  98  |  12  ----------------------------<  88  3.8   |  24  |  0.88    Ca    9.2      29 Jan 2019 05:30  Phos  3.9     01-29  Mg     2.0     01-29

## 2019-01-29 NOTE — DISCHARGE NOTE ADULT - PATIENT PORTAL LINK FT
You can access the [facility] Patient Portal, offered by [facility], by registering with the following website: [email or web address]

## 2019-01-29 NOTE — DISCHARGE NOTE ADULT - CONDITIONS AT DISCHARGE
Pt. A&Ox4, VS stable. Pt. pain adequately managed via orders. Pt. OOB as tolerated, independent. Pt. free from episodes of respiratory distress. Abd lap sites x3, c/d/i staples MARQUES. Abd soft, nontender, nondistended. Tolerating low fiber diet w/o episodes of GI distress, nausea, vomiting. Voiding adequately. Patient discharged to home. Discharge instructions given and understood. IV removed. Patient left w/ belongings and family.

## 2019-01-29 NOTE — DIETITIAN INITIAL EVALUATION ADULT. - OTHER INFO
Pt seen for Length Of Stay initial nutrition assessment. Pt is a 22 y/o M with GIB found to have Meckle's diverticulum s/p lap SBR (1/22). Pt currently on clear liquid diet, pt will start low fiber diet for lunch today. Pt has been NPO/clears for 7 days. Denies nausea/vomiting. BMs irregular-diarrhea and constipation. 3 dark BM's o/n. Pt states UBW ranges from 170-185 pounds depending on physical activity.

## 2019-01-29 NOTE — DISCHARGE NOTE ADULT - CARE PROVIDER_API CALL
Carl Crum), ColonRectal Surgery; Surgery  [address] [place] [address]  Phone: [phone]  Fax: [phone]

## 2019-01-29 NOTE — DIETITIAN INITIAL EVALUATION ADULT. - PERTINENT MEDS FT
MEDICATIONS  (STANDING):  enoxaparin Injectable 40 milliGRAM(s) SubCutaneous daily  iron sucrose IVPB 200 milliGRAM(s) IV Intermittent every 48 hours  pantoprazole  Injectable 40 milliGRAM(s) IV Push daily

## 2019-01-29 NOTE — DISCHARGE NOTE ADULT - CARE PROVIDERS DIRECT ADDRESSES
[email or web address]

## 2019-01-29 NOTE — DISCHARGE NOTE ADULT - HOSPITAL COURSE
22 y/o Male with PMHx of asthma, GERD, Dieulafoy lesions (GI bleeding 3 years ago requiring 2 transfusions, EGD showed lesion in stomach, which was cauterized, then GI bleed again 11/2018 hospitalized at [facility] requiring 1 PRBC) presents to pre surgical testing.  Pt reports during GI workup, upper and lower endoscopies were does as well as a Meckel's scan that was positive for Meckel's diverticulum.   Pt is scheduled for laparoscopic anterior resection on 1/22/19.     On 1/22 pt underwent Small bowel resection. Post operative course complicated by nausea and multiple episodes of vomiting. Pt also had multiple episodes of rectal bleeding. NGT was placed. Winkler was placed for urinary retention.  H/H decreased and he was given a total of 3 units PRBC during hospital stay. On 1/24 pt underwent emergent CT angio which showed Low-grade small bowel obstruction with transition point just distal to a small bowel anastomosis in the right lower quadrant. Pt continued to have GI fxn with minimal blood in BM's. His winkler was removed and patient passed a TOV.  Heme was consulted for chronic anemia and he was started on IV iron supplementation. His NGT was removed and his diet was advanced as tolerated. At this time, pt is tolerating a regular diet, ambulating and voiding.  Pt has been deemed stable for discharge at this time.

## 2019-01-29 NOTE — DIETITIAN INITIAL EVALUATION ADULT. - PERTINENT LABORATORY DATA
01-29 Na 138 mmol/L Glu 88 mg/dL K+ 3.8 mmol/L Cr 0.88 mg/dL BUN 12 mg/dL Phos 3.9 mg/dL  Hgb 7.2 g/dL<L> Hct 22.1 %<L>     Hemoglobin A1C, Whole Blood: 4.9 % (01-16-19 @ 13:25)

## 2019-01-30 VITALS
RESPIRATION RATE: 18 BRPM | TEMPERATURE: 98 F | SYSTOLIC BLOOD PRESSURE: 129 MMHG | OXYGEN SATURATION: 98 % | DIASTOLIC BLOOD PRESSURE: 82 MMHG | HEART RATE: 98 BPM

## 2019-01-30 LAB
ANION GAP SERPL CALC-SCNC: 15 MMO/L — HIGH (ref 7–14)
BUN SERPL-MCNC: 16 MG/DL — SIGNIFICANT CHANGE UP (ref 7–23)
CALCIUM SERPL-MCNC: 9.3 MG/DL — SIGNIFICANT CHANGE UP (ref 8.4–10.5)
CHLORIDE SERPL-SCNC: 98 MMOL/L — SIGNIFICANT CHANGE UP (ref 98–107)
CO2 SERPL-SCNC: 24 MMOL/L — SIGNIFICANT CHANGE UP (ref 22–31)
CREAT SERPL-MCNC: 0.94 MG/DL — SIGNIFICANT CHANGE UP (ref 0.5–1.3)
GLUCOSE SERPL-MCNC: 79 MG/DL — SIGNIFICANT CHANGE UP (ref 70–99)
HCT VFR BLD CALC: 25 % — LOW (ref 39–50)
HGB BLD-MCNC: 8 G/DL — LOW (ref 13–17)
MAGNESIUM SERPL-MCNC: 2 MG/DL — SIGNIFICANT CHANGE UP (ref 1.6–2.6)
MCHC RBC-ENTMCNC: 25 PG — LOW (ref 27–34)
MCHC RBC-ENTMCNC: 32 % — SIGNIFICANT CHANGE UP (ref 32–36)
MCV RBC AUTO: 78.1 FL — LOW (ref 80–100)
NRBC # FLD: 0 K/UL — LOW (ref 25–125)
PHOSPHATE SERPL-MCNC: 4.4 MG/DL — SIGNIFICANT CHANGE UP (ref 2.5–4.5)
PLATELET # BLD AUTO: 350 K/UL — SIGNIFICANT CHANGE UP (ref 150–400)
PMV BLD: 10.2 FL — SIGNIFICANT CHANGE UP (ref 7–13)
POTASSIUM SERPL-MCNC: 3.8 MMOL/L — SIGNIFICANT CHANGE UP (ref 3.5–5.3)
POTASSIUM SERPL-SCNC: 3.8 MMOL/L — SIGNIFICANT CHANGE UP (ref 3.5–5.3)
RBC # BLD: 3.2 M/UL — LOW (ref 4.2–5.8)
RBC # FLD: 22 % — HIGH (ref 10.3–14.5)
SODIUM SERPL-SCNC: 137 MMOL/L — SIGNIFICANT CHANGE UP (ref 135–145)
WBC # BLD: 6.58 K/UL — SIGNIFICANT CHANGE UP (ref 3.8–10.5)
WBC # FLD AUTO: 6.58 K/UL — SIGNIFICANT CHANGE UP (ref 3.8–10.5)

## 2019-01-30 RX ORDER — FERROUS SULFATE 325(65) MG
1 TABLET ORAL
Qty: 30 | Refills: 0 | OUTPATIENT
Start: 2019-01-30 | End: 2019-02-28

## 2019-01-30 RX ORDER — ACETAMINOPHEN 500 MG
2 TABLET ORAL
Qty: 0 | Refills: 0 | COMMUNITY
Start: 2019-01-30

## 2019-01-30 RX ORDER — ASCORBIC ACID 60 MG
1 TABLET,CHEWABLE ORAL
Qty: 30 | Refills: 0 | OUTPATIENT
Start: 2019-01-30 | End: 2019-02-28

## 2019-01-30 RX ADMIN — PANTOPRAZOLE SODIUM 40 MILLIGRAM(S): 20 TABLET, DELAYED RELEASE ORAL at 07:06

## 2019-01-30 NOTE — PROGRESS NOTE ADULT - SUBJECTIVE AND OBJECTIVE BOX
A Team Surgery Progress Note     Subjective: Patient was seen and examined during morning rounds. Pt had multiple brown/black BM's overnight. Passing flatus. Pt denies f/c, n/v, SOB, CP/ABP, lightheadedness.      Objective:    Physical Exam:  Gen: NAD, awake and alert  Head/Neck: NC/AT  Resp: CTABL, nonlabored  Abdomen: soft, no tenderness, or distention. staple line intact   Extremeties: no edema, gross sensation intact, gross motor function intact      MEDICATIONS  (STANDING):  dextrose 5% + sodium chloride 0.45% with potassium chloride 20 mEq/L 1000 milliLiter(s) (50 mL/Hr) IV Continuous <Continuous>  enoxaparin Injectable 40 milliGRAM(s) SubCutaneous daily  iron sucrose IVPB 200 milliGRAM(s) IV Intermittent every 48 hours  pantoprazole  Injectable 40 milliGRAM(s) IV Push daily    MEDICATIONS  (PRN):  ALBUTerol    0.083%. 2.5 milliGRAM(s) Nebulizer once PRN Shortness of Breath and/or Wheezing      Vital Signs Last 24 Hrs  T(C): 36.9 (01-30-19 @ 05:58), Max: 37.1 (01-29-19 @ 10:10)  HR: 65 (01-30-19 @ 05:58) (65 - 95)  BP: 110/65 (01-30-19 @ 05:58) (109/60 - 123/54)  ABP: --  ABP(mean): --  RR: 18 (01-30-19 @ 05:58) (18 - 20)  SpO2: 100% (01-30-19 @ 05:58) (98% - 100%)  Wt(kg): --  CVP(mm Hg): --      01-29 @ 07:01  -  01-30 @ 07:00  --------------------------------------------------------  IN:    Oral Fluid: 220 mL  Total IN: 220 mL    OUT:    Voided: 2400 mL  Total OUT: 2400 mL    Total NET: -2180 mL            LABS:             CBC (01-29 @ 05:30)                              7.2<L>                         6.54    )----------------(  302        --    % Neutrophils, --    % Lymphocytes, ANC: --                                  22.1<L>    BMP (01-29 @ 05:30)             138     |  98      |  12    		Ca++ --      Ca 9.2                ---------------------------------( 88    		Mg 2.0                3.8     |  24      |  0.88  			Ph 3.9

## 2019-01-30 NOTE — PROGRESS NOTE ADULT - ASSESSMENT
23yMale POD1 s/p SBR for Meckel's diverticulum.     Plan:  - Transfuse 1 unit  - Diet: CLD  - OOB, ambulate as tolerated  - Encourage use of IS  - Monitor dressings  - DVT ppx  - Monitor GIF
23yMale POD3 s/p SBR for Meckel's diverticulum.     Plan:  - Diet: NPO  - d/c winkler, f/u TOV  - dc NGT  - OOB, ambulate as tolerated  - Encourage use of IS  - Monitor dressings  - DVT ppx  - cont to monitor BMs for blood  - Dispo: pending hospital course
23yMale POD4 s/p SBR for Meckel's diverticulum.     Plan:  - Diet: CLD  - OOB, ambulate as tolerated  - Encourage use of IS  - Monitor dressings  - DVT ppx  - cont to monitor BMs for blood  - Dispo: pending hospital course
23yMale POD5 s/p SBR for Meckel's diverticulum.     Plan:  - Diet: CLD  - OOB, ambulate as tolerated  - Encourage use of IS  - Monitor dressings  - DVT ppx  - cont to monitor BMs for blood  - Dispo: pending hospital course
23yMale POD6 s/p SBR for Meckel's diverticulum.     Plan:  - Diet: NPO. Possible advance to CLD today.   - OOB, ambulate as tolerated  - Encourage use of IS  - Monitor dressings  - DVT ppx  - cont to monitor BMs for blood  - Dispo: pending hospital course
23yMale POD7 s/p SBR for Meckel's diverticulum. Having GIF. Improving clinically.     Plan:  - Diet: advance to LRD  - IVL  - OOB, ambulate as tolerated  - Encourage use of IS  - Monitor dressings  - DVT ppx  - cont to monitor BMs for blood  - Dispo: possible home today if tolerating diet
23yMale POD7 s/p SBR for Meckel's diverticulum. Having GIF. Improving clinically.     Plan:  - Diet: continue diet   - IVL  - OOB, ambulate as tolerated  - Encourage use of IS  - Monitor dressings  - DVT ppx  - Dispo: Home today
23yMale POD2 s/p SBR for Meckel's diverticulum.     Plan:  - CT Angio, f/u official read  - Place SICU consult  - Diet: back down from CLD to NPO  - OOB, ambulate as tolerated  - Encourage use of IS  - Monitor dressings  - DVT ppx  - Monitor GIF  - Dispo: pending hospital course    Plan reviewed and discussed with attending.
24 y/o M, admitted with anemia with BRBPR due to Merkel's diverticulum s/p small bowel resection. He was found to be anemic and tachycardic post procedure, for which he required total of 3 U PRBC. Hematology consulted for anemia. Baseline Hb 8-10 gm/dl with MCV in 70s in 11/2018.    1. Anemia:  -With low MCV. Patient with h/o Merkel's diverticulum s/p small bowel resection on 1/23. Post procedure patient received 3 U PRBC with inappropriate response to transfusion.  -Peripheral smear reviewed with microcytic/hypochromic to normochromic cells.   -Anemia w/u- consistent with severe iron deficiency anemia, with inappropriate reticulocyte response and low ferritin. Iron def due to blood loss from Merkel's diverticulum.  -Will recommend starting IV Venofer 200 mg Q48 hrs X 5 doses.   -Usually takes 2-4 weeks for IV Venofer to respond.     D/W patient and family.    Kaye Hernandez, PGY VI  Hematology/Oncology  Pager: [phone] ( After 5 pm and on weekends, please page the on-call fellow).

## 2019-02-01 PROBLEM — D64.9 ANEMIA, UNSPECIFIED: Chronic | Status: ACTIVE | Noted: 2019-01-16

## 2019-02-01 PROBLEM — Q24.9 CONGENITAL MALFORMATION OF HEART, UNSPECIFIED: Chronic | Status: ACTIVE | Noted: 2019-01-16

## 2019-02-01 PROBLEM — Q43.0 MECKEL'S DIVERTICULUM (DISPLACED) (HYPERTROPHIC): Chronic | Status: ACTIVE | Noted: 2019-01-16

## 2019-02-04 ENCOUNTER — APPOINTMENT (OUTPATIENT)
Dept: SURGERY | Facility: CLINIC | Age: 24
End: 2019-02-04
Payer: MEDICAID

## 2019-02-04 VITALS
BODY MASS INDEX: 22.51 KG/M2 | HEART RATE: 68 BPM | WEIGHT: 152 LBS | TEMPERATURE: 98.5 F | HEIGHT: 69 IN | DIASTOLIC BLOOD PRESSURE: 76 MMHG | SYSTOLIC BLOOD PRESSURE: 115 MMHG

## 2019-02-04 PROCEDURE — 99024 POSTOP FOLLOW-UP VISIT: CPT

## 2019-02-20 ENCOUNTER — OUTPATIENT (OUTPATIENT)
Dept: OUTPATIENT SERVICES | Facility: HOSPITAL | Age: 24
LOS: 1 days | Discharge: HOME | End: 2019-02-20

## 2019-02-20 DIAGNOSIS — N28.9 DISORDER OF KIDNEY AND URETER, UNSPECIFIED: Chronic | ICD-10-CM

## 2019-02-20 DIAGNOSIS — D64.9 ANEMIA, UNSPECIFIED: ICD-10-CM

## 2019-02-25 ENCOUNTER — APPOINTMENT (OUTPATIENT)
Dept: SURGERY | Facility: CLINIC | Age: 24
End: 2019-02-25

## 2019-02-25 ENCOUNTER — APPOINTMENT (OUTPATIENT)
Dept: SURGERY | Facility: CLINIC | Age: 24
End: 2019-02-25
Payer: MEDICAID

## 2019-02-25 VITALS
TEMPERATURE: 98.1 F | HEIGHT: 69 IN | HEART RATE: 82 BPM | SYSTOLIC BLOOD PRESSURE: 128 MMHG | BODY MASS INDEX: 23.4 KG/M2 | DIASTOLIC BLOOD PRESSURE: 70 MMHG | WEIGHT: 158 LBS

## 2019-02-25 DIAGNOSIS — Z09 ENCOUNTER FOR FOLLOW-UP EXAMINATION AFTER COMPLETED TREATMENT FOR CONDITIONS OTHER THAN MALIGNANT NEOPLASM: ICD-10-CM

## 2019-02-25 PROCEDURE — 99024 POSTOP FOLLOW-UP VISIT: CPT

## 2019-02-25 NOTE — ASSESSMENT
[FreeTextEntry1] : patient is doing well, Tolerating a diet and moving his bowels.\par \par \par \par incisions are  well healed. \par \par \par f/u prn

## 2019-02-28 ENCOUNTER — RESULT REVIEW (OUTPATIENT)
Age: 24
End: 2019-02-28

## 2019-02-28 ENCOUNTER — APPOINTMENT (OUTPATIENT)
Dept: HEMATOLOGY ONCOLOGY | Facility: CLINIC | Age: 24
End: 2019-02-28

## 2019-02-28 VITALS
WEIGHT: 158.51 LBS | BODY MASS INDEX: 23.48 KG/M2 | HEART RATE: 85 BPM | HEIGHT: 68.94 IN | SYSTOLIC BLOOD PRESSURE: 128 MMHG | RESPIRATION RATE: 16 BRPM | OXYGEN SATURATION: 99 % | DIASTOLIC BLOOD PRESSURE: 80 MMHG | TEMPERATURE: 98 F

## 2019-02-28 LAB
HCT VFR BLD CALC: 33.5 % — LOW (ref 39–50)
HGB BLD-MCNC: 10.5 G/DL — LOW (ref 13–17)
MCHC RBC-ENTMCNC: 21.8 PG — LOW (ref 27–34)
MCHC RBC-ENTMCNC: 31.4 G/DL — LOW (ref 32–36)
MCV RBC AUTO: 69.5 FL — LOW (ref 80–100)
PLATELET # BLD AUTO: 349 K/UL — SIGNIFICANT CHANGE UP (ref 150–400)
RBC # BLD: 4.83 M/UL — SIGNIFICANT CHANGE UP (ref 4.2–5.8)
RBC # FLD: 19.9 % — HIGH (ref 10.3–14.5)
WBC # BLD: 5.7 K/UL — SIGNIFICANT CHANGE UP (ref 3.8–10.5)
WBC # FLD AUTO: 5.7 K/UL — SIGNIFICANT CHANGE UP (ref 3.8–10.5)

## 2019-02-28 NOTE — HISTORY OF PRESENT ILLNESS
[de-identified] : 22 yo M hx meckel's diverticulum s/p resection on 1/22/19 and recently developed iron deficiency anemia likely secondary to GI blood loss presents for initial visit. \par \par On 11/23/18 pt's Hgb was 12.1 and had been in the 15 in 3/2018. He had been symptomatic from his meckel's diverticulum and starting having dark stools in 11/2018 and developed iron deficiency anemia secondary to blood loss. He had a resection of Meckel's diverticulum on 1/22/19 and post op course c/b continued dark stools/melena with drop in Hgb to 6s and required 3 units of pRBCs during his admission. He was seen by inpatient Heme team and received one dose of venofer 200 mg IV and was discharged home on po iron. He presents today for an initial visit for management of iron deficiency anemia. Denies any further melena or BRBPR, no CP or SOB, no palpitations, no PURVIS. Fatigue is improving. He is taking ferrous sulfate once a day with vitamin C without any issues.

## 2019-02-28 NOTE — ASSESSMENT
[FreeTextEntry1] : 24 yo M hx meckel's diverticulum s/p resection on 1/22/19 and recently developed iron deficiency anemia likely secondary to GI blood loss presents for initial visit. \par \par 1. Iron deficiency anemia: due to blood loss from GI tract, bleeding now resolved and doing well post-op. \par - has been taking ferrous sulfate 325 mg daily for the past month\par - CBC today shows improvement in Hgb to 10.5 (previously 8.0 in 1/2019)\par - check reticulocyte count, iron with TIBC and ferritin to evaluate for response\par - check vitamin B12, folate, LDH, and haptoglobin to complete anemia work up\par - increase ferrous sulfate to 325 mg bid with vitamin C. Expect iron stores should be repleted and CBC normalized in the next 2-3 months now that his source of blood loss has been adressed and resolved.\par \par Dispo:\par - RTC in 3months\par - Best Contact: [phone]\par \par d/w Dr. Garcia

## 2019-02-28 NOTE — PHYSICAL EXAM
[Fully active, able to carry on all pre-disease performance without restriction] : Status 0 - Fully active, able to carry on all pre-disease performance without restriction [Normal] : affect appropriate [de-identified] : well healed laparoscopic surgical incisions on abdomen

## 2019-03-01 DIAGNOSIS — D50.9 IRON DEFICIENCY ANEMIA, UNSPECIFIED: ICD-10-CM

## 2019-03-01 LAB
ALBUMIN SERPL ELPH-MCNC: 4.8 G/DL — SIGNIFICANT CHANGE UP (ref 3.3–5)
ALP SERPL-CCNC: 68 U/L — SIGNIFICANT CHANGE UP (ref 40–120)
ALT FLD-CCNC: 16 U/L — SIGNIFICANT CHANGE UP (ref 10–45)
ANION GAP SERPL CALC-SCNC: 15 MMOL/L — SIGNIFICANT CHANGE UP (ref 5–17)
AST SERPL-CCNC: 15 U/L — SIGNIFICANT CHANGE UP (ref 10–40)
BILIRUB SERPL-MCNC: <0.2 MG/DL — SIGNIFICANT CHANGE UP (ref 0.2–1.2)
BUN SERPL-MCNC: 13 MG/DL — SIGNIFICANT CHANGE UP (ref 7–23)
CALCIUM SERPL-MCNC: 10.4 MG/DL — SIGNIFICANT CHANGE UP (ref 8.4–10.5)
CHLORIDE SERPL-SCNC: 102 MMOL/L — SIGNIFICANT CHANGE UP (ref 96–108)
CO2 SERPL-SCNC: 23 MMOL/L — SIGNIFICANT CHANGE UP (ref 22–31)
CREAT SERPL-MCNC: 0.83 MG/DL — SIGNIFICANT CHANGE UP (ref 0.5–1.3)
FERRITIN SERPL-MCNC: 8 NG/ML — LOW (ref 30–400)
FOLATE SERPL-MCNC: 16.7 NG/ML — SIGNIFICANT CHANGE UP
GLUCOSE SERPL-MCNC: 90 MG/DL — SIGNIFICANT CHANGE UP (ref 70–99)
HAPTOGLOB SERPL-MCNC: 109 MG/DL — SIGNIFICANT CHANGE UP (ref 34–200)
IRON SATN MFR SERPL: 30 UG/DL — LOW (ref 45–165)
IRON SATN MFR SERPL: 7 % — LOW (ref 16–55)
LDH SERPL L TO P-CCNC: 156 U/L — SIGNIFICANT CHANGE UP (ref 50–242)
POTASSIUM SERPL-MCNC: 4.7 MMOL/L — SIGNIFICANT CHANGE UP (ref 3.5–5.3)
POTASSIUM SERPL-SCNC: 4.7 MMOL/L — SIGNIFICANT CHANGE UP (ref 3.5–5.3)
PROT SERPL-MCNC: 7.9 G/DL — SIGNIFICANT CHANGE UP (ref 6–8.3)
SODIUM SERPL-SCNC: 140 MMOL/L — SIGNIFICANT CHANGE UP (ref 135–145)
TIBC SERPL-MCNC: 428 UG/DL — SIGNIFICANT CHANGE UP (ref 220–430)
UIBC SERPL-MCNC: 398 UG/DL — HIGH (ref 110–370)
VIT B12 SERPL-MCNC: 691 PG/ML — SIGNIFICANT CHANGE UP (ref 232–1245)

## 2019-03-19 ENCOUNTER — RX RENEWAL (OUTPATIENT)
Age: 24
End: 2019-03-19

## 2019-04-03 ENCOUNTER — APPOINTMENT (OUTPATIENT)
Dept: INTERNAL MEDICINE | Facility: CLINIC | Age: 24
End: 2019-04-03
Payer: MEDICAID

## 2019-04-03 VITALS
BODY MASS INDEX: 22.96 KG/M2 | DIASTOLIC BLOOD PRESSURE: 81 MMHG | TEMPERATURE: 98 F | WEIGHT: 155 LBS | HEART RATE: 76 BPM | SYSTOLIC BLOOD PRESSURE: 128 MMHG | RESPIRATION RATE: 16 BRPM | HEIGHT: 69 IN | OXYGEN SATURATION: 97 %

## 2019-04-03 DIAGNOSIS — L98.9 DISORDER OF THE SKIN AND SUBCUTANEOUS TISSUE, UNSPECIFIED: ICD-10-CM

## 2019-04-03 PROCEDURE — 99214 OFFICE O/P EST MOD 30 MIN: CPT

## 2019-04-03 NOTE — PHYSICAL EXAM
[No Acute Distress] : no acute distress [Well Nourished] : well nourished [Well Developed] : well developed [Well-Appearing] : well-appearing [Normal Sclera/Conjunctiva] : normal sclera/conjunctiva [Normal Outer Ear/Nose] : the outer ears and nose were normal in appearance [Normal Oropharynx] : the oropharynx was normal [No JVD] : no jugular venous distention [No Respiratory Distress] : no respiratory distress  [Clear to Auscultation] : lungs were clear to auscultation bilaterally [No Accessory Muscle Use] : no accessory muscle use [Normal Rate] : normal rate  [Regular Rhythm] : with a regular rhythm [Normal S1, S2] : normal S1 and S2 [No Murmur] : no murmur heard [No Edema] : there was no peripheral edema [Soft] : abdomen soft [Non Tender] : non-tender [Non-distended] : non-distended [Normal Bowel Sounds] : normal bowel sounds [Normal Anterior Cervical Nodes] : no anterior cervical lymphadenopathy [No CVA Tenderness] : no CVA  tenderness [No Joint Swelling] : no joint swelling [Normal Gait] : normal gait [Speech Grossly Normal] : speech grossly normal [Normal Affect] : the affect was normal [Normal Insight/Judgement] : insight and judgment were intact [de-identified] : no tenderness, evidence of infection over  laparoscopic scars surgical scars noted- [de-identified] : + scattered dark discoloration left posterior shoulder extending to top and down the front of the shoulder

## 2019-04-03 NOTE — HISTORY OF PRESENT ILLNESS
[de-identified] : 25 yo male, with hx of Asthma, s/p Mekel's diverticulum surgery January 22, 2019, anemia, presents for follow up visit, prescription renewals\par Pt states he is on iron tabs and is following with hematology\par He complaints of randomly having "stinging" at the incision sites\par He also says he has discoloration in the left shoulder for about 12 years but says seems to have been changing lately and wants to see dermatology\par

## 2019-04-03 NOTE — ASSESSMENT
[FreeTextEntry1] : iron def anemia\par s/p mekel's diverticula surgery\par cont iron tabs\par f/u with hematology as scheduled for next month ( May)\par \par skin lesion\par referred to see dermatology

## 2019-05-21 ENCOUNTER — OUTPATIENT (OUTPATIENT)
Dept: OUTPATIENT SERVICES | Facility: HOSPITAL | Age: 24
LOS: 1 days | Discharge: HOME | End: 2019-05-21

## 2019-05-21 DIAGNOSIS — N28.9 DISORDER OF KIDNEY AND URETER, UNSPECIFIED: Chronic | ICD-10-CM

## 2019-05-21 DIAGNOSIS — D64.9 ANEMIA, UNSPECIFIED: ICD-10-CM

## 2019-05-23 ENCOUNTER — APPOINTMENT (OUTPATIENT)
Dept: HEMATOLOGY ONCOLOGY | Facility: CLINIC | Age: 24
End: 2019-05-23

## 2019-05-23 ENCOUNTER — RESULT REVIEW (OUTPATIENT)
Age: 24
End: 2019-05-23

## 2019-05-23 VITALS
RESPIRATION RATE: 16 BRPM | WEIGHT: 167 LBS | OXYGEN SATURATION: 98 % | SYSTOLIC BLOOD PRESSURE: 123 MMHG | TEMPERATURE: 98.9 F | DIASTOLIC BLOOD PRESSURE: 81 MMHG | BODY MASS INDEX: 24.66 KG/M2 | HEART RATE: 76 BPM

## 2019-05-23 DIAGNOSIS — D50.9 IRON DEFICIENCY ANEMIA, UNSPECIFIED: ICD-10-CM

## 2019-05-23 LAB
HCT VFR BLD CALC: 44.3 % — SIGNIFICANT CHANGE UP (ref 39–50)
HGB BLD-MCNC: 15.7 G/DL — SIGNIFICANT CHANGE UP (ref 13–17)
MCHC RBC-ENTMCNC: 26.5 PG — LOW (ref 27–34)
MCHC RBC-ENTMCNC: 35.5 G/DL — SIGNIFICANT CHANGE UP (ref 32–36)
MCV RBC AUTO: 74.6 FL — LOW (ref 80–100)
PLATELET # BLD AUTO: 244 K/UL — SIGNIFICANT CHANGE UP (ref 150–400)
RBC # BLD: 5.93 M/UL — HIGH (ref 4.2–5.8)
RBC # FLD: 18.4 % — HIGH (ref 10.3–14.5)
WBC # BLD: 5.5 K/UL — SIGNIFICANT CHANGE UP (ref 3.8–10.5)
WBC # FLD AUTO: 5.5 K/UL — SIGNIFICANT CHANGE UP (ref 3.8–10.5)

## 2019-05-23 RX ORDER — PANTOPRAZOLE 40 MG/1
40 TABLET, DELAYED RELEASE ORAL
Refills: 0 | Status: DISCONTINUED | COMMUNITY
End: 2019-05-23

## 2019-05-24 PROBLEM — D50.9 ANEMIA, IRON DEFICIENCY: Status: ACTIVE | Noted: 2019-02-28

## 2019-05-24 LAB
ALBUMIN SERPL ELPH-MCNC: 4.8 G/DL
ALP BLD-CCNC: 63 U/L
ALT SERPL-CCNC: 23 U/L
ANION GAP SERPL CALC-SCNC: 14 MMOL/L
AST SERPL-CCNC: 18 U/L
BILIRUB SERPL-MCNC: 0.3 MG/DL
BUN SERPL-MCNC: 12 MG/DL
CALCIUM SERPL-MCNC: 10 MG/DL
CHLORIDE SERPL-SCNC: 100 MMOL/L
CO2 SERPL-SCNC: 27 MMOL/L
CREAT SERPL-MCNC: 0.91 MG/DL
FERRITIN SERPL-MCNC: 53 NG/ML
GLUCOSE SERPL-MCNC: 90 MG/DL
IRON SATN MFR SERPL: 60 %
IRON SERPL-MCNC: 198 UG/DL
POTASSIUM SERPL-SCNC: 4.5 MMOL/L
PROT SERPL-MCNC: 8.1 G/DL
SODIUM SERPL-SCNC: 141 MMOL/L
TIBC SERPL-MCNC: 331 UG/DL
UIBC SERPL-MCNC: 133 UG/DL

## 2019-05-24 NOTE — ADDENDUM
[FreeTextEntry1] : Patient seen and examined with fellow, laboratory studies and all pertinent test were reviewed. Assessment and plan were discussed.

## 2019-05-24 NOTE — PHYSICAL EXAM
[Fully active, able to carry on all pre-disease performance without restriction] : Status 0 - Fully active, able to carry on all pre-disease performance without restriction [Normal] : affect appropriate [de-identified] : well healed laparoscopic surgical incisions on abdomen

## 2019-05-24 NOTE — HISTORY OF PRESENT ILLNESS
[5 - Distress Level] : Distress Level: 5 [de-identified] : 23 yo M hx meckel's diverticulum s/p resection on 1/22/19 and recently developed iron deficiency anemia likely secondary to GI blood loss presents for follow up visit. \par \par On 11/23/18 pt's Hgb was 12.1 and had been in the 15 in 3/2018. He had been symptomatic from his meckel's diverticulum and starting having dark stools in 11/2018 and developed iron deficiency anemia secondary to blood loss. He had a resection of Meckel's diverticulum on 1/22/19 and post op course c/b continued dark stools/melena with drop in Hgb to 6s and required 3 units of pRBCs during his admission. He was seen by inpatient Heme team and received one dose of venofer 200 mg IV and was discharged home on po iron. Pt has been tolerating po iron, taking it twice a day, no GI side effects, no constipation. Denies any melena or BRBPR, no CP or SOB, no palpitations, no PURVIS. [FreeTextEntry7] : Seen by  today

## 2019-05-24 NOTE — ASSESSMENT
[FreeTextEntry1] : 25 yo M hx meckel's diverticulum s/p resection on 1/22/19 and recently developed iron deficiency anemia secondary to GI blood loss presents for follow up visit. \par \par 1. Iron deficiency anemia: due to blood loss from GI tract, bleeding has resolved and doing well post-op. \par - has been taking ferrous sulfate 325 mg twice a day with vitamin C\par - CBC from today with normal Hgb of 15.7 (previously 8 in 1/2019 and 10.5 in 2/2019)\par - iron studies no longer consistent with iron deficiency. Iron of 198, TIBC 331, Ferritin 53, and %sat of 60. \par - will stop iron supplementation\par - rest of anemia work up negative (LDH, hapto, vitamin B12, folate)\par - iron stores have been repleted, hgb has normalized, and source of blood loss has been addressed and is no longer having bleeding. \par - can follow up with PCP for monitoring of CBC, does not need to follow up with Hematology. Please refer back as needed. Discussed with patient. \par \par Dispo:\par - Best Contact: [phone]\par \par d/w Dr. Cummings

## 2019-05-28 DIAGNOSIS — D50.9 IRON DEFICIENCY ANEMIA, UNSPECIFIED: ICD-10-CM

## 2019-08-22 ENCOUNTER — APPOINTMENT (OUTPATIENT)
Dept: HEMATOLOGY ONCOLOGY | Facility: CLINIC | Age: 24
End: 2019-08-22

## 2019-09-04 ENCOUNTER — APPOINTMENT (OUTPATIENT)
Dept: INTERNAL MEDICINE | Facility: CLINIC | Age: 24
End: 2019-09-04
Payer: MEDICAID

## 2019-09-04 VITALS
HEART RATE: 87 BPM | SYSTOLIC BLOOD PRESSURE: 136 MMHG | WEIGHT: 173 LBS | BODY MASS INDEX: 25.62 KG/M2 | RESPIRATION RATE: 14 BRPM | TEMPERATURE: 99 F | DIASTOLIC BLOOD PRESSURE: 84 MMHG | HEIGHT: 69 IN | OXYGEN SATURATION: 97 %

## 2019-09-04 DIAGNOSIS — L02.91 CUTANEOUS ABSCESS, UNSPECIFIED: ICD-10-CM

## 2019-09-04 PROCEDURE — 99214 OFFICE O/P EST MOD 30 MIN: CPT

## 2019-09-15 NOTE — ASSESSMENT
[FreeTextEntry1] : skin abscess\par augmentin 875mg po BID with food\par warm compresses\par referred to derm\par

## 2019-09-15 NOTE — PHYSICAL EXAM
[Normal Sclera/Conjunctiva] : normal sclera/conjunctiva [Normal Outer Ear/Nose] : the outer ears and nose were normal in appearance [No JVD] : no jugular venous distention [Normal] : normal rate, regular rhythm, normal S1 and S2 and no murmur heard [No Edema] : there was no peripheral edema [Soft] : abdomen soft [Non Tender] : non-tender [Non-distended] : non-distended [Normal Bowel Sounds] : normal bowel sounds [Normal Posterior Cervical Nodes] : no posterior cervical lymphadenopathy [Normal Anterior Cervical Nodes] : no anterior cervical lymphadenopathy [No CVA Tenderness] : no CVA  tenderness [No Joint Swelling] : no joint swelling [Normal Gait] : normal gait [Normal Affect] : the affect was normal [Alert and Oriented x3] : oriented to person, place, and time [Normal Insight/Judgement] : insight and judgment were intact [de-identified] : left buttocks area, small pin head size skin opening with small blood in opening, no pus drainage noted

## 2019-09-18 ENCOUNTER — APPOINTMENT (OUTPATIENT)
Dept: SURGERY | Facility: CLINIC | Age: 24
End: 2019-09-18
Payer: MEDICAID

## 2019-09-18 VITALS
SYSTOLIC BLOOD PRESSURE: 137 MMHG | WEIGHT: 173 LBS | DIASTOLIC BLOOD PRESSURE: 76 MMHG | HEIGHT: 69 IN | HEART RATE: 91 BPM | BODY MASS INDEX: 25.62 KG/M2 | RESPIRATION RATE: 16 BRPM | OXYGEN SATURATION: 99 % | TEMPERATURE: 99.1 F

## 2019-09-18 DIAGNOSIS — Z78.9 OTHER SPECIFIED HEALTH STATUS: ICD-10-CM

## 2019-09-18 DIAGNOSIS — L05.91 PILONIDAL CYST W/OUT ABSCESS: ICD-10-CM

## 2019-09-18 PROCEDURE — 99204 OFFICE O/P NEW MOD 45 MIN: CPT

## 2019-09-18 RX ORDER — ASCORBIC ACID 500 MG
500 TABLET ORAL
Qty: 30 | Refills: 3 | Status: DISCONTINUED | COMMUNITY
Start: 2019-02-28 | End: 2019-09-18

## 2019-09-18 RX ORDER — AMOXICILLIN AND CLAVULANATE POTASSIUM 875; 125 MG/1; MG/1
875-125 TABLET, COATED ORAL
Qty: 20 | Refills: 0 | Status: DISCONTINUED | COMMUNITY
Start: 2019-09-04 | End: 2019-09-18

## 2019-09-18 RX ORDER — CHLORHEXIDINE GLUCONATE 4 %
325 (65 FE) LIQUID (ML) TOPICAL TWICE DAILY
Qty: 60 | Refills: 3 | Status: DISCONTINUED | COMMUNITY
Start: 2019-02-28 | End: 2019-09-18

## 2019-09-21 PROBLEM — L05.91 PILONIDAL CYST: Status: ACTIVE | Noted: 2019-09-21

## 2019-09-21 NOTE — CONSULT LETTER
[Consult Letter:] : I had the pleasure of evaluating your patient, [unfilled]. [Dear  ___] : Dear  [unfilled], [Consult Closing:] : Thank you very much for allowing me to participate in the care of this patient.  If you have any questions, please do not hesitate to contact me. [Please see my note below.] : Please see my note below. [Sincerely,] : Sincerely, [DrZara  ___] : Dr. ROACH [FreeTextEntry2] : Dr Raffi Koch [FreeTextEntry3] : Ayanna Shaw M.D., F.A.C.S., F.CHRISTINE.S.C.R.S.\par Assistant Professor of Surgery\par [employer] [facility]\par \par

## 2019-09-21 NOTE — ASSESSMENT
[FreeTextEntry1] : 23 yo male with first and relatively minor episodes of pilonidal cyst flare-up. Minimal to no symptoms at this time.  \par I educated the pt on the dx of pilonidal cyst and natural history of the disease. \par Given mild nature of pt's symptoms, I discussed with the pt that I would not recommend elective pilonidal cystectomy at this time. I would consider resection if his symptoms worsen or if he has episodes of an actual abscess requiring I&D. I briefly discussed the details of pilonidal cystectomy with fasciocutaneous flap creation.\par I recommended laser hair removal (or use of a depilatory cream) as a means to possibly prevent further flare-up episodes. \par RTO as needed.

## 2019-09-21 NOTE — PHYSICAL EXAM
[FreeTextEntry1] : This is a 24-year-old well-developed male in no apparent distress.\par \par HEENT normocephalic, anicteric, external ears normal bilaterally, EOMs intact.\par \par Lungs - clear to auscultation bilaterally\par \par Cardiac - regular rate and rhythm.\par \par Abdomen soft, nontender, nondistended, no masses. No hepatosplenomegaly.\par \par No inguinal lymphadenopathy bilaterally.\par \par Sacrococcygeal area has a midline skin pit and a pinpoint skin opening to the right of the midline c/w the area of the recent discharge. Picture c/w pilonidal cyst. There is dense hair in the area. No cellulitis or fluctuance.\par \par Neuro-cranial nerves grossly intact. Normal gait.\par \par Psychiatric-oriented to time place and person. Good understanding of conversation.\par \par \par

## 2019-09-21 NOTE — HISTORY OF PRESENT ILLNESS
[FreeTextEntry1] : 23 y/o male here for a pilonidal cyst. He first noticed a lump in the tailbone area two months ago. The lump got bigger, then drained pus and blood. Since then the lump resolved. He continued to have small amount of bloody discharge for few weeks. He was prescribed a 10 day course of Augmentin by PMD which he completed last week. Since then, he reports much less drainage. He has no pain. \par He is S/p laparoscopicassisted small bowel resection 01/2019 for bleeding Meckel's diverticulum. He has recovered well from this.

## 2019-12-05 ENCOUNTER — APPOINTMENT (OUTPATIENT)
Dept: INTERNAL MEDICINE | Facility: CLINIC | Age: 24
End: 2019-12-05
Payer: MEDICAID

## 2019-12-05 VITALS
HEIGHT: 69 IN | DIASTOLIC BLOOD PRESSURE: 81 MMHG | SYSTOLIC BLOOD PRESSURE: 124 MMHG | HEART RATE: 80 BPM | OXYGEN SATURATION: 98 % | WEIGHT: 178 LBS | BODY MASS INDEX: 26.36 KG/M2 | TEMPERATURE: 99.1 F | RESPIRATION RATE: 14 BRPM

## 2019-12-05 DIAGNOSIS — J06.9 ACUTE UPPER RESPIRATORY INFECTION, UNSPECIFIED: ICD-10-CM

## 2019-12-05 PROCEDURE — 99213 OFFICE O/P EST LOW 20 MIN: CPT

## 2019-12-05 NOTE — HISTORY OF PRESENT ILLNESS
[FreeTextEntry8] : PT DEVELOPED FLU LIKE SX 2 WEEKS AGO ,MOST SX GOT BETTER BUT COUGH DID NOT ,USING ALBUTEROL INH FEW TIMES ADAY WHEN CHEST TIGHTNESS DEVELOPS;NO FEVER ,GOOD APPETITE ,NO SPUTUM AND SCANT NASAL D/C \par PT HAD BRONCHITIS IN THE PAST ,LAST TIME 8 Y AGO \par FREQUENT ASTHMA EXACERBATIONS WHEN DEVELOPED VIRAL ILLNESS

## 2019-12-05 NOTE — ASSESSMENT
[FreeTextEntry1] : ACUTE VISIT OF 24 Y OLD MALE WITH PMX OF MILD INTERMITTENT ASTHMA WHO PRESENTS WITH URI ASTHMA EXACERBATION = PREDNISONE IN TAPERING DOSES RX,ADD MUCINEX AND PLENTY OF FLUIDS AS WELL \par RTO IF FEVER OR PURULENT SPUTUM-NASAL D/C

## 2019-12-05 NOTE — PHYSICAL EXAM
[Normal Nasal Mucosa] : the nasal mucosa was normal [Normal Rhythm/Effort] : normal respiratory rhythm and effort [Dry Cough] : a dry cough was heard [Paroxysmal Cough] : a paroxysmal cough was heard [Scattered Wheezes] : scattered wheezing was heard [Normal to Percussion] : the lungs were normal to percussion [Normal] : palpation of the chest was normal

## 2019-12-05 NOTE — REVIEW OF SYSTEMS
[Fever] : fever [Chills] : chills [Nasal Discharge] : nasal discharge [Wheezing] : wheezing [Cough] : cough [Negative] : Psychiatric

## 2020-02-05 ENCOUNTER — APPOINTMENT (OUTPATIENT)
Dept: INTERNAL MEDICINE | Facility: CLINIC | Age: 25
End: 2020-02-05
Payer: MEDICAID

## 2020-02-05 VITALS
SYSTOLIC BLOOD PRESSURE: 124 MMHG | OXYGEN SATURATION: 97 % | HEIGHT: 69 IN | BODY MASS INDEX: 26.51 KG/M2 | WEIGHT: 179 LBS | HEART RATE: 88 BPM | DIASTOLIC BLOOD PRESSURE: 72 MMHG | RESPIRATION RATE: 12 BRPM | TEMPERATURE: 98.5 F

## 2020-02-05 DIAGNOSIS — J45.901 UNSPECIFIED ASTHMA WITH (ACUTE) EXACERBATION: ICD-10-CM

## 2020-02-05 PROCEDURE — 99214 OFFICE O/P EST MOD 30 MIN: CPT | Mod: 25

## 2020-02-05 PROCEDURE — 94640 AIRWAY INHALATION TREATMENT: CPT

## 2020-02-05 NOTE — HISTORY OF PRESENT ILLNESS
[de-identified] : Mr. VERO MENDENHALL is a 24 year old male, with hx of asthma, presents for follow up visit\par He is complaining of cough. Says he was sick with cough early December. Was treated by another provider with prednisone taper. He says he got better but the cough is still there\par Denies SOB, wheezing

## 2020-02-05 NOTE — ASSESSMENT
[FreeTextEntry1] : asthma exacerbation\par neb treatment done in office\par medrol dose pack\par symbicort\par cont albuterol inhaler prn\par f/u with pulmonary-referral given

## 2020-02-05 NOTE — PHYSICAL EXAM
[Normal Sclera/Conjunctiva] : normal sclera/conjunctiva [Normal Outer Ear/Nose] : the outer ears and nose were normal in appearance [No JVD] : no jugular venous distention [No Respiratory Distress] : no respiratory distress  [Normal] : normal rate, regular rhythm, normal S1 and S2 and no murmur heard [No Edema] : there was no peripheral edema [Soft] : abdomen soft [Non Tender] : non-tender [Non-distended] : non-distended [Normal Anterior Cervical Nodes] : no anterior cervical lymphadenopathy [No CVA Tenderness] : no CVA  tenderness [No Joint Swelling] : no joint swelling [No Rash] : no rash [Normal Gait] : normal gait [Normal Affect] : the affect was normal [Alert and Oriented x3] : oriented to person, place, and time [Normal Insight/Judgement] : insight and judgment were intact [de-identified] : mild expiratory wheezing

## 2020-02-10 ENCOUNTER — NON-APPOINTMENT (OUTPATIENT)
Age: 25
End: 2020-02-10

## 2020-02-10 ENCOUNTER — APPOINTMENT (OUTPATIENT)
Dept: PULMONOLOGY | Facility: CLINIC | Age: 25
End: 2020-02-10
Payer: MEDICAID

## 2020-02-10 VITALS
BODY MASS INDEX: 26.51 KG/M2 | RESPIRATION RATE: 12 BRPM | TEMPERATURE: 98.4 F | OXYGEN SATURATION: 97 % | SYSTOLIC BLOOD PRESSURE: 136 MMHG | DIASTOLIC BLOOD PRESSURE: 66 MMHG | HEART RATE: 74 BPM | HEIGHT: 69 IN | WEIGHT: 179 LBS

## 2020-02-10 DIAGNOSIS — Z87.19 PERSONAL HISTORY OF OTHER DISEASES OF THE DIGESTIVE SYSTEM: ICD-10-CM

## 2020-02-10 PROCEDURE — 94060 EVALUATION OF WHEEZING: CPT

## 2020-02-10 PROCEDURE — 99204 OFFICE O/P NEW MOD 45 MIN: CPT | Mod: 25

## 2020-02-10 PROCEDURE — 95012 NITRIC OXIDE EXP GAS DETER: CPT

## 2020-02-10 NOTE — PHYSICAL EXAM
[No Acute Distress] : no acute distress [Normal Oropharynx] : normal oropharynx [No Neck Mass] : no neck mass [Normal Rate/Rhythm] : normal rate/rhythm [Normal Appearance] : normal appearance [Normal S1, S2] : normal s1, s2 [No Murmurs] : no murmurs [No Abnormalities] : no abnormalities [Clear to Auscultation Bilaterally] : clear to auscultation bilaterally [No Resp Distress] : no resp distress [No Clubbing] : no clubbing [Benign] : benign [Normal Gait] : normal gait [No Cyanosis] : no cyanosis [No Edema] : no edema [FROM] : FROM [Normal Color/ Pigmentation] : normal color/ pigmentation [Normal Affect] : normal affect [Oriented x3] : oriented x3 [No Focal Deficits] : no focal deficits

## 2020-02-10 NOTE — REVIEW OF SYSTEMS
[Hemoptysis] : no hemoptysis [Cough] : cough [Chest Tightness] : no chest tightness [Frequent URIs] : no frequent URIs [Sputum] : no sputum [Dyspnea] : no dyspnea [Pleuritic Pain] : no pleuritic pain [Wheezing] : no wheezing [Negative] : Endocrine

## 2020-02-10 NOTE — ASSESSMENT
[FreeTextEntry1] : In summary the patient is a 24-year-old asthmatic who presents for a pulmonary consult. Patient's physical exam is significant for good air entry bilaterally.\par \par The patient underwent spirometry which revealed normal lung volumes\par FeNO 23 PPB\par Patient started on Symbicort and instructed to followup in 2 weeks

## 2020-02-10 NOTE — HISTORY OF PRESENT ILLNESS
[Never] : never [TextBox_4] : Patient is a 24-year-old male with past medical history significant for asthma who is referred today for a pulmonary consult. The patient presents complaining of cough with occasional shortness of breath and dyspnea on exertion. He states that he rarely uses his rescue medication

## 2020-02-24 ENCOUNTER — NON-APPOINTMENT (OUTPATIENT)
Age: 25
End: 2020-02-24

## 2020-02-24 ENCOUNTER — APPOINTMENT (OUTPATIENT)
Dept: PULMONOLOGY | Facility: CLINIC | Age: 25
End: 2020-02-24
Payer: MEDICAID

## 2020-02-24 VITALS
TEMPERATURE: 99 F | HEART RATE: 82 BPM | WEIGHT: 174 LBS | OXYGEN SATURATION: 97 % | BODY MASS INDEX: 25.77 KG/M2 | HEIGHT: 69 IN | DIASTOLIC BLOOD PRESSURE: 76 MMHG | SYSTOLIC BLOOD PRESSURE: 124 MMHG | RESPIRATION RATE: 12 BRPM

## 2020-02-24 PROCEDURE — 95012 NITRIC OXIDE EXP GAS DETER: CPT

## 2020-02-24 PROCEDURE — 94060 EVALUATION OF WHEEZING: CPT

## 2020-02-24 PROCEDURE — 99214 OFFICE O/P EST MOD 30 MIN: CPT | Mod: 25

## 2020-02-24 NOTE — PHYSICAL EXAM
[No Acute Distress] : no acute distress [Normal Oropharynx] : normal oropharynx [No Neck Mass] : no neck mass [Normal Appearance] : normal appearance [Normal Rate/Rhythm] : normal rate/rhythm [Normal S1, S2] : normal s1, s2 [No Murmurs] : no murmurs [Clear to Auscultation Bilaterally] : clear to auscultation bilaterally [No Resp Distress] : no resp distress [No Abnormalities] : no abnormalities [Benign] : benign [Normal Gait] : normal gait [No Clubbing] : no clubbing [No Edema] : no edema [No Cyanosis] : no cyanosis [FROM] : FROM [Normal Color/ Pigmentation] : normal color/ pigmentation [No Focal Deficits] : no focal deficits [Normal Affect] : normal affect [Oriented x3] : oriented x3

## 2020-02-24 NOTE — ASSESSMENT
[FreeTextEntry1] : In summary the patient is a 24-year-old male with past medical history significant for poorly controlled asthma who presents for followup. The patient's physical exam is within normal limits.\par \par Spirometry revealed normal lung volumes\par FeNO 23PPB\par Patient is instructed to continue Symbicort in followup in 3 months

## 2020-02-24 NOTE — HISTORY OF PRESENT ILLNESS
[Former] : former [Never] : never [TextBox_4] : Patient is a 24-year-old male who was recently diagnosed with poorly controlled asthma who presents today for followup. The patient was started on Symbicort and presents stating that his cough shortness of breath and dyspnea on exertion have resolved. Patient also states that he rarely uses his rescue inhaler

## 2020-03-25 NOTE — PATIENT PROFILE ADULT - [facility] [employer]
Called and spoke to pt, notified we are resched appts due to covid-19. Pt is requesting to keep appt as sched as he is due for labs and needs this for insurance purposes.   Agreeable to keep appt at sched time and date.   
(4) no limitation

## 2020-05-11 ENCOUNTER — APPOINTMENT (OUTPATIENT)
Dept: PULMONOLOGY | Facility: CLINIC | Age: 25
End: 2020-05-11
Payer: MEDICAID

## 2020-05-11 VITALS
OXYGEN SATURATION: 97 % | HEART RATE: 81 BPM | TEMPERATURE: 98.8 F | BODY MASS INDEX: 26.07 KG/M2 | RESPIRATION RATE: 12 BRPM | WEIGHT: 176 LBS | DIASTOLIC BLOOD PRESSURE: 84 MMHG | SYSTOLIC BLOOD PRESSURE: 136 MMHG | HEIGHT: 69 IN

## 2020-05-11 PROCEDURE — 94060 EVALUATION OF WHEEZING: CPT

## 2020-05-11 PROCEDURE — 94729 DIFFUSING CAPACITY: CPT

## 2020-05-11 PROCEDURE — 94726 PLETHYSMOGRAPHY LUNG VOLUMES: CPT

## 2020-05-11 PROCEDURE — 95012 NITRIC OXIDE EXP GAS DETER: CPT

## 2020-05-11 PROCEDURE — 99214 OFFICE O/P EST MOD 30 MIN: CPT | Mod: 25

## 2020-05-11 NOTE — ASSESSMENT
[FreeTextEntry1] : Patient is a 25-year-old male with past medical history significant for allergic asthma who presents in followup. the patient's physical exam is within normal limits. PFT revealed obstructive airways disease\par FeNO was 16 PPB consistent with compliance\par Patient instructed to continue current medications and to follow up in 3 months

## 2020-05-11 NOTE — PHYSICAL EXAM
[No Acute Distress] : no acute distress [Normal Oropharynx] : normal oropharynx [Normal Appearance] : normal appearance [No Neck Mass] : no neck mass [Normal S1, S2] : normal s1, s2 [Normal Rate/Rhythm] : normal rate/rhythm [No Murmurs] : no murmurs [No Resp Distress] : no resp distress [Clear to Auscultation Bilaterally] : clear to auscultation bilaterally [Benign] : benign [No Abnormalities] : no abnormalities [No Cyanosis] : no cyanosis [No Clubbing] : no clubbing [Normal Gait] : normal gait [FROM] : FROM [No Edema] : no edema [Normal Color/ Pigmentation] : normal color/ pigmentation [Oriented x3] : oriented x3 [Normal Affect] : normal affect [No Focal Deficits] : no focal deficits

## 2020-05-11 NOTE — HISTORY OF PRESENT ILLNESS
[TextBox_4] : Patient is a 24-year-old male with past medical history significant for allergic asthma who presents in followup. The patient states that his cough shortness of breath and dyspnea on exertion have resolved with the use of his long-acting beta agonist and inhaled corticosteroid. He denies fevers chills chest pain weight loss or hemoptysis

## 2020-08-10 ENCOUNTER — MED ADMIN CHARGE (OUTPATIENT)
Age: 25
End: 2020-08-10

## 2020-08-10 ENCOUNTER — APPOINTMENT (OUTPATIENT)
Dept: INTERNAL MEDICINE | Facility: CLINIC | Age: 25
End: 2020-08-10
Payer: MEDICAID

## 2020-08-10 PROCEDURE — 90734 MENACWYD/MENACWYCRM VACC IM: CPT

## 2020-08-10 PROCEDURE — 90471 IMMUNIZATION ADMIN: CPT

## 2020-08-12 ENCOUNTER — APPOINTMENT (OUTPATIENT)
Dept: PULMONOLOGY | Facility: CLINIC | Age: 25
End: 2020-08-12
Payer: MEDICAID

## 2020-08-12 VITALS
DIASTOLIC BLOOD PRESSURE: 68 MMHG | OXYGEN SATURATION: 97 % | BODY MASS INDEX: 25.1 KG/M2 | TEMPERATURE: 97.5 F | HEART RATE: 80 BPM | WEIGHT: 170 LBS | RESPIRATION RATE: 12 BRPM | SYSTOLIC BLOOD PRESSURE: 107 MMHG

## 2020-08-12 PROCEDURE — 99214 OFFICE O/P EST MOD 30 MIN: CPT

## 2020-08-12 NOTE — HISTORY OF PRESENT ILLNESS
[Never] : never [TextBox_4] : Patient is a 25-year-old male with past medical history significant for asthma and exertional asthma who presents for follow-up.  Patient states that his cough shortness of breath and dyspnea on exertion have resolved with the use of budesonide.  He denies any recent fevers chills chest pain weight loss or hemoptysis

## 2020-08-12 NOTE — ASSESSMENT
[FreeTextEntry1] : In summary the patient is a 25-year-old male with size induced asthma who presents for follow-up.  The patient's physical exam is within normal limits.  Prescription renewal for budesonide given.  Patient instructed to continue current therapy and follow-up in 6 months

## 2020-08-12 NOTE — PHYSICAL EXAM
[No Acute Distress] : no acute distress [Normal Oropharynx] : normal oropharynx [Normal Appearance] : normal appearance [No Neck Mass] : no neck mass [Normal Rate/Rhythm] : normal rate/rhythm [Normal S1, S2] : normal s1, s2 [No Murmurs] : no murmurs [No Resp Distress] : no resp distress [Clear to Auscultation Bilaterally] : clear to auscultation bilaterally [Benign] : benign [No Abnormalities] : no abnormalities [Normal Gait] : normal gait [No Clubbing] : no clubbing [No Cyanosis] : no cyanosis [No Edema] : no edema [No Focal Deficits] : no focal deficits [Normal Color/ Pigmentation] : normal color/ pigmentation [FROM] : FROM [Normal Affect] : normal affect [Oriented x3] : oriented x3

## 2020-12-21 PROBLEM — J06.9 ACUTE URI: Status: RESOLVED | Noted: 2019-12-05 | Resolved: 2020-12-21

## 2021-02-22 ENCOUNTER — APPOINTMENT (OUTPATIENT)
Dept: PULMONOLOGY | Facility: CLINIC | Age: 26
End: 2021-02-22
Payer: MEDICAID

## 2021-02-22 VITALS
HEIGHT: 69 IN | DIASTOLIC BLOOD PRESSURE: 78 MMHG | WEIGHT: 170 LBS | SYSTOLIC BLOOD PRESSURE: 143 MMHG | HEART RATE: 73 BPM | TEMPERATURE: 97.5 F | OXYGEN SATURATION: 99 % | RESPIRATION RATE: 12 BRPM | BODY MASS INDEX: 25.18 KG/M2

## 2021-02-22 DIAGNOSIS — R06.02 SHORTNESS OF BREATH: ICD-10-CM

## 2021-02-22 DIAGNOSIS — K92.1 MELENA: ICD-10-CM

## 2021-02-22 PROCEDURE — 99214 OFFICE O/P EST MOD 30 MIN: CPT

## 2021-02-22 PROCEDURE — 99072 ADDL SUPL MATRL&STAF TM PHE: CPT

## 2021-02-22 NOTE — HISTORY OF PRESENT ILLNESS
[Never] : never [TextBox_4] : Patient is a 25-year-old asthmatic who presents today for follow-up.  The patient states that his cough shortness of breath and dyspnea on exertion have resolved with the use of his long-acting beta agonist and inhaled corticosteroid.  He states that he rarely uses his rescue inhaler.  He denies fevers chills chest pain weight loss or hemoptysis

## 2021-03-03 ENCOUNTER — RX RENEWAL (OUTPATIENT)
Age: 26
End: 2021-03-03

## 2021-06-02 ENCOUNTER — APPOINTMENT (OUTPATIENT)
Dept: ORTHOPEDIC SURGERY | Facility: CLINIC | Age: 26
End: 2021-06-02
Payer: MEDICAID

## 2021-06-02 VITALS
BODY MASS INDEX: 25.18 KG/M2 | WEIGHT: 170 LBS | TEMPERATURE: 97.2 F | DIASTOLIC BLOOD PRESSURE: 75 MMHG | HEART RATE: 71 BPM | HEIGHT: 69 IN | SYSTOLIC BLOOD PRESSURE: 123 MMHG

## 2021-06-02 DIAGNOSIS — S61.212A LACERATION W/OUT FOREIGN BODY OF RIGHT MIDDLE FINGER W/OUT DAMAGE TO NAIL, INITIAL ENCOUNTER: ICD-10-CM

## 2021-06-02 PROCEDURE — 99203 OFFICE O/P NEW LOW 30 MIN: CPT

## 2021-06-02 NOTE — DISCUSSION/SUMMARY
[FreeTextEntry1] : He has findings consistent with right middle finger laceration in the region of the pulp.  There is no evidence of an injury to the tendons or significant nerve laceration.\par \par I had a discussion regarding today's visit, the prognosis of this diagnosis, and treatment recommendations and options. At this time, he was instructed on scar massage and desensitization. He will follow-up on an as needed basis. \par \par The patient has agreed to this plan of management and has expressed full understanding.  All questions were fully answered to the patient's satisfaction.\par \par My cumulative time spent on this patient's visit included: Preparation for the visit, review of the medical records, review of pertinent diagnostic studies, examination and counseling of the patient on the above diagnosis, treatment plan and prognosis, orders of diagnostic tests, medication and/or appropriate procedures and documentation in the medical records of today's visit.

## 2021-06-02 NOTE — HISTORY OF PRESENT ILLNESS
[Right] : right hand dominant [FreeTextEntry1] : He comes in today for evaluation of right middle finger injury, which occurred 1 month ago. He cut the tip of the finger while washing a knife. He went to urgent care at the time and had x-rays which were normal. He also received his tetanus shot and was prescribed oral antibiotics. He is still having some sensitivity. He presents today for a second opinion.

## 2021-06-02 NOTE — END OF VISIT
[FreeTextEntry3] : This note was written by Wendy Langston on 06/02/2021 acting solely as a scribe for Dr. Stevan Méndez.\par  \par All medical record entries made by the Scribe were at my, Dr. Stevan Méndez, direction and personally dictated by me on 06/02/2021. I have personally reviewed the chart and agree that the record accurately reflects my personal performance of the history, physical exam, assessment and plan.

## 2021-06-02 NOTE — PHYSICAL EXAM
[de-identified] : - Constitutional: This is a male in no obvious distress.  \par - Psych: Patient is alert and oriented to person, place and time.  Patient has a normal mood and affect.\par - Cardiovascular: Normal pulses throughout the upper extremities.  No significant varicosities are noted in the upper extremities. \par - Neuro: Strength and sensation are intact throughout the upper extremities.  Patient has normal coordination.\par - Respiratory:  Patient exhibits no evidence of shortness of breath or difficulty breathing.\par - Skin: No rashes, lesions, or other abnormalities are noted in the upper extremities.\par \par ---\par  \par Examination of the right middle finger demonstrates a laceration along the pulp distally.  It is well-healed.  There is hypersensitivity..  She has hypersensitivity along the laceration but has normal sensation distally.

## 2021-08-23 ENCOUNTER — APPOINTMENT (OUTPATIENT)
Dept: PULMONOLOGY | Facility: CLINIC | Age: 26
End: 2021-08-23
Payer: MEDICAID

## 2021-08-23 VITALS
BODY MASS INDEX: 25.62 KG/M2 | SYSTOLIC BLOOD PRESSURE: 138 MMHG | OXYGEN SATURATION: 98 % | WEIGHT: 173 LBS | TEMPERATURE: 96.8 F | HEART RATE: 90 BPM | DIASTOLIC BLOOD PRESSURE: 80 MMHG | HEIGHT: 69 IN | RESPIRATION RATE: 12 BRPM

## 2021-08-23 DIAGNOSIS — Z87.09 PERSONAL HISTORY OF OTHER DISEASES OF THE RESPIRATORY SYSTEM: ICD-10-CM

## 2021-08-23 PROCEDURE — 99214 OFFICE O/P EST MOD 30 MIN: CPT

## 2021-08-23 NOTE — ASSESSMENT
[FreeTextEntry1] : In summary the patient is a 25-year-old male with size induced asthma who presents for follow-up.  The patient's physical exam is within normal limits.  Prescription renewal for budesonide given.  Patient instructed to continue current therapy and follow-up in 3 months

## 2021-08-23 NOTE — HISTORY OF PRESENT ILLNESS
[Never] : never [TextBox_4] : Patient is a 26-year-old male with past medical history significant for poorly controlled asthma, hyperlipidemia iron deficiency anemia who presents today for follow-up.  Patient complains of occasional cough and shortness of breath.  He states that he is compliant with his bronchodilator therapy.  He currently denies fevers chills chest pain weight loss or hemoptysis

## 2021-12-06 ENCOUNTER — APPOINTMENT (OUTPATIENT)
Dept: PULMONOLOGY | Facility: CLINIC | Age: 26
End: 2021-12-06
Payer: MEDICAID

## 2021-12-06 VITALS
HEIGHT: 69 IN | TEMPERATURE: 96.8 F | SYSTOLIC BLOOD PRESSURE: 118 MMHG | WEIGHT: 175 LBS | BODY MASS INDEX: 25.92 KG/M2 | HEART RATE: 80 BPM | OXYGEN SATURATION: 99 % | DIASTOLIC BLOOD PRESSURE: 61 MMHG | RESPIRATION RATE: 12 BRPM

## 2021-12-06 DIAGNOSIS — R05.3 CHRONIC COUGH: ICD-10-CM

## 2021-12-06 DIAGNOSIS — G47.8 OTHER SLEEP DISORDERS: ICD-10-CM

## 2021-12-06 PROCEDURE — 99214 OFFICE O/P EST MOD 30 MIN: CPT

## 2021-12-06 NOTE — ASSESSMENT
[FreeTextEntry1] : In summary the patient is a 26-year-old male with past medical history significant for intermittent asthma who presents for follow-up.  Patient's physical exam is within normal limits.  The patient is now being started on Singulair in view of his worsening cough.  I have also instructed him to use his rescue inhaler as needed.  He does have symptoms of possible obstructive sleep apnea with nonrestorative sleep witnessed apneas and snoring.  A home sleep monitor has been ordered and the patient is instructed to follow-up in 3 months

## 2021-12-06 NOTE — REVIEW OF SYSTEMS
[Cough] : cough [Hemoptysis] : no hemoptysis [Sputum] : no sputum [Dyspnea] : no dyspnea [Wheezing] : wheezing [A.M. Dry Mouth] : a.m. dry mouth [SOB on Exertion] : sob on exertion [Negative] : Endocrine

## 2021-12-06 NOTE — HISTORY OF PRESENT ILLNESS
[Never] : never [TextBox_4] : Patient is a 26-year-old male with past medical history significant for intermittent asthma who presents today for follow-up.  Patient complains of worsening cough at night.  He also complains of loud snoring and witnessed apneas.  He states that he is compliant with his medications.  He currently denies fevers chills chest pain weight loss or hemoptysis

## 2022-01-18 NOTE — ADDENDUM
[FreeTextEntry1] : I, Wendy Langston, acted solely as a scribe for Dr. Méndez on this date 06/02/2021. No

## 2022-02-07 ENCOUNTER — RX RENEWAL (OUTPATIENT)
Age: 27
End: 2022-02-07

## 2022-02-07 RX ORDER — MONTELUKAST 10 MG/1
10 TABLET, FILM COATED ORAL
Qty: 30 | Refills: 3 | Status: ACTIVE | COMMUNITY
Start: 2021-12-06 | End: 1900-01-01

## 2022-03-23 ENCOUNTER — APPOINTMENT (OUTPATIENT)
Dept: PULMONOLOGY | Facility: CLINIC | Age: 27
End: 2022-03-23
Payer: MEDICAID

## 2022-03-23 VITALS
RESPIRATION RATE: 12 BRPM | SYSTOLIC BLOOD PRESSURE: 126 MMHG | OXYGEN SATURATION: 100 % | WEIGHT: 172 LBS | BODY MASS INDEX: 25.48 KG/M2 | HEIGHT: 69 IN | TEMPERATURE: 97.8 F | HEART RATE: 92 BPM | DIASTOLIC BLOOD PRESSURE: 80 MMHG

## 2022-03-23 DIAGNOSIS — R06.83 SNORING: ICD-10-CM

## 2022-03-23 PROCEDURE — 99214 OFFICE O/P EST MOD 30 MIN: CPT

## 2022-03-23 NOTE — ASSESSMENT
[FreeTextEntry1] : In summary the patient is a 26-year-old male with past medical history significant for intermittent asthma who presents for follow-up.  Patient's physical exam is within normal limits.  Prescription renewal performed.  Patient instructed to continue current medications and follow-up in 3 months

## 2022-03-23 NOTE — HISTORY OF PRESENT ILLNESS
[Never] : never [TextBox_4] : Patient is a 26-year-old male with past medical history significant for intermittent asthma who presents today for follow-up\par \par Patient reports asthma symptoms have been well controlled with Budesonide inhaler. He has been doing a lot more physical activity and has noticed a decrease in exercise induced symptoms

## 2022-04-01 ENCOUNTER — RX RENEWAL (OUTPATIENT)
Age: 27
End: 2022-04-01

## 2022-04-01 RX ORDER — BUDESONIDE AND FORMOTEROL FUMARATE DIHYDRATE 160; 4.5 UG/1; UG/1
160-4.5 AEROSOL RESPIRATORY (INHALATION) TWICE DAILY
Qty: 2 | Refills: 6 | Status: ACTIVE | COMMUNITY
Start: 2020-08-12 | End: 1900-01-01

## 2022-06-21 ENCOUNTER — APPOINTMENT (OUTPATIENT)
Dept: INTERNAL MEDICINE | Facility: CLINIC | Age: 27
End: 2022-06-21
Payer: MEDICAID

## 2022-06-21 VITALS
WEIGHT: 168 LBS | HEIGHT: 69 IN | HEART RATE: 91 BPM | BODY MASS INDEX: 24.88 KG/M2 | SYSTOLIC BLOOD PRESSURE: 120 MMHG | OXYGEN SATURATION: 98 % | TEMPERATURE: 97.5 F | DIASTOLIC BLOOD PRESSURE: 69 MMHG | RESPIRATION RATE: 12 BRPM

## 2022-06-21 DIAGNOSIS — Z23 ENCOUNTER FOR IMMUNIZATION: ICD-10-CM

## 2022-06-21 PROCEDURE — 99395 PREV VISIT EST AGE 18-39: CPT

## 2022-06-21 NOTE — ASSESSMENT
[FreeTextEntry1] : \par Physical\par \par blood work ordered\par \par anxiety, depression-denies SI / HI\par referred to psychiatry\par \par hx of asthma\par cont inhalers\par follows with pulmonary\par \par blood work ordered\par \par follow up in one week for lab results\par

## 2022-06-21 NOTE — HEALTH RISK ASSESSMENT
[Never] : Never [No] : No [Employed] : employed [Single] : single [Sexually Active] : sexually active [de-identified] : with dad

## 2022-06-21 NOTE — HISTORY OF PRESENT ILLNESS
[de-identified] : \par Mr. VERO MENDENHALL is a 27 year old male, with hx of asthma, Meckel's diverticulum, s/p small bowel resection ( Jan 2019),  presents for annual physical\par Pt states he has been feeling anxious and depressed. He has been having trouble sleeping, trouble waking up. He says he has been like this since he had the surgery for the Meckel's. After he had the surgery he was speaking with a counselor.\par Denies suicidal ideations, intent to hurt himself or others\par Denies SOB, chest pain, abdominal pain, N/V/D, leg swelling, headache, dizziness \par \par

## 2022-06-27 ENCOUNTER — APPOINTMENT (OUTPATIENT)
Dept: PULMONOLOGY | Facility: CLINIC | Age: 27
End: 2022-06-27

## 2022-07-03 LAB
25(OH)D3 SERPL-MCNC: 34.2 NG/ML
ALBUMIN SERPL ELPH-MCNC: 4.7 G/DL
ALP BLD-CCNC: 71 U/L
ALT SERPL-CCNC: 21 U/L
ANION GAP SERPL CALC-SCNC: 11 MMOL/L
APPEARANCE: CLEAR
AST SERPL-CCNC: 24 U/L
BASOPHILS # BLD AUTO: 0.07 K/UL
BASOPHILS NFR BLD AUTO: 1 %
BILIRUB SERPL-MCNC: 0.2 MG/DL
BILIRUBIN URINE: NEGATIVE
BLOOD URINE: NEGATIVE
BUN SERPL-MCNC: 16 MG/DL
CALCIUM SERPL-MCNC: 10.4 MG/DL
CHLORIDE SERPL-SCNC: 103 MMOL/L
CO2 SERPL-SCNC: 27 MMOL/L
COLOR: YELLOW
COVID-19 NUCLEOCAPSID  GAM ANTIBODY INTERPRETATION: POSITIVE
COVID-19 SPIKE DOMAIN ANTIBODY INTERPRETATION: POSITIVE
CREAT SERPL-MCNC: 0.98 MG/DL
EGFR: 108 ML/MIN/1.73M2
EOSINOPHIL # BLD AUTO: 0.08 K/UL
EOSINOPHIL NFR BLD AUTO: 1.1 %
ESTIMATED AVERAGE GLUCOSE: 108 MG/DL
FERRITIN SERPL-MCNC: 127 NG/ML
GLUCOSE QUALITATIVE U: NEGATIVE
GLUCOSE SERPL-MCNC: 101 MG/DL
HBA1C MFR BLD HPLC: 5.4 %
HCT VFR BLD CALC: 45.1 %
HGB BLD-MCNC: 14.4 G/DL
IMM GRANULOCYTES NFR BLD AUTO: 0.1 %
IRON SATN MFR SERPL: 17 %
IRON SERPL-MCNC: 56 UG/DL
KETONES URINE: NEGATIVE
LEUKOCYTE ESTERASE URINE: NEGATIVE
LYMPHOCYTES # BLD AUTO: 1.78 K/UL
LYMPHOCYTES NFR BLD AUTO: 24.8 %
MAN DIFF?: NORMAL
MCHC RBC-ENTMCNC: 27.6 PG
MCHC RBC-ENTMCNC: 31.9 GM/DL
MCV RBC AUTO: 86.4 FL
MONOCYTES # BLD AUTO: 0.79 K/UL
MONOCYTES NFR BLD AUTO: 11 %
NEUTROPHILS # BLD AUTO: 4.44 K/UL
NEUTROPHILS NFR BLD AUTO: 62 %
NITRITE URINE: NEGATIVE
PH URINE: 6.5
PLATELET # BLD AUTO: 274 K/UL
POTASSIUM SERPL-SCNC: 4.3 MMOL/L
PROT SERPL-MCNC: 7.8 G/DL
PROTEIN URINE: NORMAL
RBC # BLD: 5.22 M/UL
RBC # FLD: 13.2 %
SARS-COV-2 AB SERPL IA-ACNC: >250 U/ML
SARS-COV-2 AB SERPL QL IA: 43.7 INDEX
SODIUM SERPL-SCNC: 140 MMOL/L
SPECIFIC GRAVITY URINE: 1.02
TIBC SERPL-MCNC: 323 UG/DL
TSH SERPL-ACNC: 0.75 UIU/ML
UIBC SERPL-MCNC: 266 UG/DL
UROBILINOGEN URINE: NORMAL
VIT B12 SERPL-MCNC: 410 PG/ML
WBC # FLD AUTO: 7.17 K/UL

## 2022-07-06 LAB
CHOLEST SERPL-MCNC: 204 MG/DL
HDLC SERPL-MCNC: 45 MG/DL
LDLC SERPL CALC-MCNC: 116 MG/DL
NONHDLC SERPL-MCNC: 159 MG/DL
TRIGL SERPL-MCNC: 214 MG/DL

## 2023-01-10 NOTE — HISTORY OF PRESENT ILLNESS
Airway  Performed by: Ranjith Finch MD  Authorized by: Ranjith Finch MD     Final Airway Type:  Endotracheal airway  Final Endotracheal Airway*:  ETT  ETT Size (mm)*:  7.0  Cuff*:  Regular  Technique Used for Successful ETT Placement:  Direct laryngoscopy  Devices/Methods Used in Placement*:  Mask  Intubation Procedure*:  Preoxygenation, ETCO2, Atraumatic, Dentition Unchanged and Pharynx Clear  Insertion Site:  Oral  Blade Type*:  MAC  Blade Size*:  3  Cuff Volume (mL):  8  Secured at (cm)*:  21  Placement Verified by: auscultation and capnometry    Attempts*:  1  Number of Other Approaches Attempted:  0  Location:  OR  Urgency:  Elective  Difficult Airway: No    Indications for Airway Management:  Anesthesia  Mask Difficulty Assessment:  1 - vent by mask  Start Time: 1/10/2023 1:50 PM    
[FreeTextEntry8] : 23 yo male, presents complaining of feeling a "bump" by his anus mid July. Says it got bigger, painful,  then it popped, a lot of pus came out and it went away.  One morning in mid August he woke up and had some "bloody-pus stain" in his underwear but did not feel a lump on have any pain there. The past 2 weeks he has been having some bloody staining pretty much everyday. Denies pain, constipation, diarrhea\par

## 2023-05-10 ENCOUNTER — APPOINTMENT (OUTPATIENT)
Dept: PULMONOLOGY | Facility: CLINIC | Age: 28
End: 2023-05-10
Payer: MEDICAID

## 2023-05-10 VITALS
OXYGEN SATURATION: 98 % | RESPIRATION RATE: 12 BRPM | DIASTOLIC BLOOD PRESSURE: 79 MMHG | HEART RATE: 69 BPM | TEMPERATURE: 98.6 F | SYSTOLIC BLOOD PRESSURE: 149 MMHG

## 2023-05-10 DIAGNOSIS — Z87.19 PERSONAL HISTORY OF OTHER DISEASES OF THE DIGESTIVE SYSTEM: ICD-10-CM

## 2023-05-10 DIAGNOSIS — Z82.49 FAMILY HISTORY OF ISCHEMIC HEART DISEASE AND OTHER DISEASES OF THE CIRCULATORY SYSTEM: ICD-10-CM

## 2023-05-10 DIAGNOSIS — F32.A ANXIETY DISORDER, UNSPECIFIED: ICD-10-CM

## 2023-05-10 DIAGNOSIS — Z83.3 FAMILY HISTORY OF DIABETES MELLITUS: ICD-10-CM

## 2023-05-10 DIAGNOSIS — F41.9 ANXIETY DISORDER, UNSPECIFIED: ICD-10-CM

## 2023-05-10 PROCEDURE — 99214 OFFICE O/P EST MOD 30 MIN: CPT

## 2023-05-10 RX ORDER — GUAIFENESIN 600 MG/1
600 TABLET, EXTENDED RELEASE ORAL TWICE DAILY
Qty: 20 | Refills: 0 | Status: DISCONTINUED | COMMUNITY
Start: 2019-12-05 | End: 2023-05-10

## 2023-05-10 RX ORDER — METHYLPREDNISOLONE 4 MG/1
4 TABLET ORAL
Qty: 1 | Refills: 1 | Status: DISCONTINUED | COMMUNITY
Start: 2020-02-05 | End: 2023-05-10

## 2023-05-10 RX ORDER — PREDNISONE 10 MG/1
10 TABLET ORAL
Qty: 12 | Refills: 0 | Status: DISCONTINUED | COMMUNITY
Start: 2019-12-05 | End: 2023-05-10

## 2023-05-10 RX ORDER — BUDESONIDE AND FORMOTEROL FUMARATE DIHYDRATE 80; 4.5 UG/1; UG/1
80-4.5 AEROSOL RESPIRATORY (INHALATION) TWICE DAILY
Qty: 1 | Refills: 5 | Status: DISCONTINUED | COMMUNITY
Start: 2020-02-05 | End: 2023-05-10

## 2023-05-10 RX ORDER — BUDESONIDE AND FORMOTEROL FUMARATE DIHYDRATE 80; 4.5 UG/1; UG/1
80-4.5 AEROSOL RESPIRATORY (INHALATION)
Qty: 10.2 | Refills: 3 | Status: DISCONTINUED | COMMUNITY
Start: 2021-03-03 | End: 2023-05-10

## 2023-05-10 RX ORDER — CEPHALEXIN 500 MG/1
500 CAPSULE ORAL
Qty: 20 | Refills: 0 | Status: DISCONTINUED | COMMUNITY
Start: 2020-01-06 | End: 2023-05-10

## 2023-05-10 RX ORDER — BUDESONIDE AND FORMOTEROL FUMARATE DIHYDRATE 160; 4.5 UG/1; UG/1
160-4.5 AEROSOL RESPIRATORY (INHALATION)
Qty: 20.4 | Refills: 3 | Status: ACTIVE | COMMUNITY
Start: 2022-04-01 | End: 1900-01-01

## 2023-05-10 NOTE — ASSESSMENT
[FreeTextEntry1] : In summary patient is a 28-year-old male with anxiety asthma who presents today for follow-up.  His physical exam is significant for good air entry bilaterally.  Prescription renewal performed.  A pulmonary function test has been ordered and the patient is instructed to follow-up in 3 months

## 2023-05-10 NOTE — HISTORY OF PRESENT ILLNESS
[Never] : never [TextBox_4] : Patient is a 28-year-old male with past medical history significant for intermittent asthma who presents today for follow-up\par \par Patient reports he has been out of his Budesonide inhaler for about a week and has noticed some chest tightness on occasion. Denies any fevers, chills, chest pain, cough or hemoptysis

## 2023-05-11 NOTE — ED ADULT TRIAGE NOTE - BP NONINVASIVE DIASTOLIC (MM HG)
86 Itraconazole Pregnancy And Lactation Text: This medication is Pregnancy Category C and it isn't know if it is safe during pregnancy. It is also excreted in breast milk.

## 2023-05-15 ENCOUNTER — APPOINTMENT (OUTPATIENT)
Dept: PULMONOLOGY | Facility: CLINIC | Age: 28
End: 2023-05-15
Payer: MEDICAID

## 2023-05-15 PROCEDURE — 94060 EVALUATION OF WHEEZING: CPT

## 2023-05-15 PROCEDURE — 94726 PLETHYSMOGRAPHY LUNG VOLUMES: CPT

## 2023-05-15 PROCEDURE — 94729 DIFFUSING CAPACITY: CPT

## 2023-08-17 ENCOUNTER — APPOINTMENT (OUTPATIENT)
Dept: INTERNAL MEDICINE | Facility: CLINIC | Age: 28
End: 2023-08-17
Payer: MEDICAID

## 2023-08-17 VITALS
SYSTOLIC BLOOD PRESSURE: 121 MMHG | TEMPERATURE: 97.2 F | RESPIRATION RATE: 21 BRPM | HEART RATE: 70 BPM | DIASTOLIC BLOOD PRESSURE: 68 MMHG | OXYGEN SATURATION: 98 %

## 2023-08-17 DIAGNOSIS — Z00.00 ENCOUNTER FOR GENERAL ADULT MEDICAL EXAMINATION W/OUT ABNORMAL FINDINGS: ICD-10-CM

## 2023-08-17 DIAGNOSIS — Z11.3 ENCOUNTER FOR SCREENING FOR INFECTIONS WITH A PREDOMINANTLY SEXUAL MODE OF TRANSMISSION: ICD-10-CM

## 2023-08-17 PROCEDURE — 99395 PREV VISIT EST AGE 18-39: CPT

## 2023-08-17 PROCEDURE — G0444 DEPRESSION SCREEN ANNUAL: CPT | Mod: 59

## 2023-08-28 LAB
25(OH)D3 SERPL-MCNC: 20.8 NG/ML
ALBUMIN SERPL ELPH-MCNC: 4.8 G/DL
ALP BLD-CCNC: 65 U/L
ALT SERPL-CCNC: 18 U/L
ANION GAP SERPL CALC-SCNC: 12 MMOL/L
APPEARANCE: CLEAR
AST SERPL-CCNC: 17 U/L
BILIRUB SERPL-MCNC: 0.5 MG/DL
BILIRUBIN URINE: NEGATIVE
BLOOD URINE: NEGATIVE
BUN SERPL-MCNC: 13 MG/DL
C TRACH RRNA SPEC QL NAA+PROBE: NOT DETECTED
CALCIUM SERPL-MCNC: 10.3 MG/DL
CHLORIDE SERPL-SCNC: 102 MMOL/L
CHOLEST SERPL-MCNC: 228 MG/DL
CO2 SERPL-SCNC: 24 MMOL/L
COLOR: YELLOW
CREAT SERPL-MCNC: 1.04 MG/DL
EGFR: 100 ML/MIN/1.73M2
ESTIMATED AVERAGE GLUCOSE: 105 MG/DL
GLUCOSE QUALITATIVE U: NEGATIVE MG/DL
GLUCOSE SERPL-MCNC: 92 MG/DL
HAV IGM SER QL: NONREACTIVE
HBA1C MFR BLD HPLC: 5.3 %
HBV CORE IGM SER QL: NONREACTIVE
HBV SURFACE AB SERPL IA-ACNC: 38.2 MIU/ML
HBV SURFACE AG SER QL: NONREACTIVE
HCV AB SER QL: NONREACTIVE
HCV S/CO RATIO: 0.17 S/CO
HDLC SERPL-MCNC: 47 MG/DL
HIV1+2 AB SPEC QL IA.RAPID: NONREACTIVE
HSV 1+2 IGG SER IA-IMP: NEGATIVE
HSV 1+2 IGG SER IA-IMP: POSITIVE
HSV1 IGG SER QL: 0.14 INDEX
HSV2 IGG SER QL: 1.22 INDEX
KETONES URINE: NEGATIVE MG/DL
LDLC SERPL CALC-MCNC: 153 MG/DL
LEUKOCYTE ESTERASE URINE: NEGATIVE
M TB IFN-G BLD-IMP: NEGATIVE
MEV IGG FLD QL IA: >300 AU/ML
MEV IGG+IGM SER-IMP: POSITIVE
MUV AB SER-ACNC: NEGATIVE
MUV IGG SER QL IA: <5 AU/ML
N GONORRHOEA RRNA SPEC QL NAA+PROBE: NOT DETECTED
NITRITE URINE: NEGATIVE
NONHDLC SERPL-MCNC: 181 MG/DL
PH URINE: 6.5
POTASSIUM SERPL-SCNC: 3.8 MMOL/L
PROT SERPL-MCNC: 7.8 G/DL
PROTEIN URINE: NEGATIVE MG/DL
QUANTIFERON TB PLUS MITOGEN MINUS NIL: 10 IU/ML
QUANTIFERON TB PLUS NIL: 0.02 IU/ML
QUANTIFERON TB PLUS TB1 MINUS NIL: 0 IU/ML
QUANTIFERON TB PLUS TB2 MINUS NIL: 0 IU/ML
RUBV IGG FLD-ACNC: 4.1 INDEX
RUBV IGG SER-IMP: POSITIVE
SODIUM SERPL-SCNC: 138 MMOL/L
SOURCE AMPLIFICATION: NORMAL
SPECIFIC GRAVITY URINE: 1.02
T PALLIDUM AB SER QL IA: NEGATIVE
TRIGL SERPL-MCNC: 158 MG/DL
TSH SERPL-ACNC: 0.92 UIU/ML
UROBILINOGEN URINE: 0.2 MG/DL
VIT B12 SERPL-MCNC: 426 PG/ML
VZV AB TITR SER: POSITIVE
VZV IGG SER IF-ACNC: 1002 INDEX

## 2023-08-28 NOTE — ASSESSMENT
[FreeTextEntry1] :  Physical  STD screening  Hx of anxiety, depression-denies SI / HI, self harm not on meds sees therapist every other week  hx of asthma cont inhalers follows with pulmonary  blood work ordered  follow up in one week for lab results forms to be completed once labs are back

## 2023-08-28 NOTE — HISTORY OF PRESENT ILLNESS
[de-identified] : Mr. VERO MENDENHALL is a 28-year-old male, with hx of asthma, Meckel's diverticulum, s/p small bowel resection (Jan 2019), presents for annual physical exam.  States he is feeling well. Offers no complaints. Would like STD screening.   Pt. states he's seeing a therapist, but is now looking for another one since he moved to [place].

## 2023-08-28 NOTE — HEALTH RISK ASSESSMENT
[Fair] : ~his/her~ current health as fair  [Good] : ~his/her~  mood as  good [No] : No [2] : 2) Feeling down, depressed, or hopeless for more than half of the days (2) [PHQ-2 Positive] : PHQ-2 Positive [1/2 of Days or More (2)] : 6.) Feeling bad about yourself, or that you are a failure, or have let yourself or your family down? Half the days or more [Moderate] : severity of depression is moderate [Very Difficult] : How difficult have these problems made it for you to do your work, take care of things at home, or get along with people? Very difficult [With Significant Other] : lives with significant other [Employed] : employed [College] : College [Significant Other] : lives with significant other [Sexually Active] : sexually active [Feels Safe at Home] : Feels safe at home [Never] : Never [Somewhat Difficult] : How difficult have these problems made it for you to do your work, take care of things at home, or get along with people? Somewhat difficult [I have developed a follow-up plan documented below in the note.] : I have developed a follow-up plan documented below in the note. [Feeling down, depressed, or hopeless] : 2) Feeling down, depressed, or hopeless [Several Days (1)] : 3.) Trouble falling asleep, or sleeping too much? Several days [Not at All (0)] : 6.) Feeling bad about yourself, or that you are a failure, or have let yourself or your family down? Not at all [Mild] : severity of depression is mild [Not at all] : How difficult have these problems made it for you to do your work, take care of things at home, or get along with people? Not at all [de-identified] : Maintains active by running. [de-identified] : Maintains a healthy diet.  [[id number]] : 4 [[id number]] : 6

## 2024-03-11 RX ORDER — ALBUTEROL SULFATE 90 UG/1
108 (90 BASE) AEROSOL, METERED RESPIRATORY (INHALATION) EVERY 4 HOURS
Qty: 1 | Refills: 3 | Status: ACTIVE | COMMUNITY
Start: 2019-04-03 | End: 1900-01-01

## 2024-03-13 ENCOUNTER — APPOINTMENT (OUTPATIENT)
Dept: INTERNAL MEDICINE | Facility: CLINIC | Age: 29
End: 2024-03-13
Payer: MEDICAID

## 2024-03-13 VITALS
TEMPERATURE: 98.1 F | RESPIRATION RATE: 21 BRPM | SYSTOLIC BLOOD PRESSURE: 123 MMHG | WEIGHT: 180 LBS | BODY MASS INDEX: 26.66 KG/M2 | DIASTOLIC BLOOD PRESSURE: 78 MMHG | OXYGEN SATURATION: 99 % | HEIGHT: 69 IN | HEART RATE: 76 BPM

## 2024-03-13 DIAGNOSIS — J45.909 UNSPECIFIED ASTHMA, UNCOMPLICATED: ICD-10-CM

## 2024-03-13 DIAGNOSIS — E78.5 HYPERLIPIDEMIA, UNSPECIFIED: ICD-10-CM

## 2024-03-13 PROCEDURE — 99214 OFFICE O/P EST MOD 30 MIN: CPT

## 2024-03-13 RX ORDER — BUDESONIDE AND FORMOTEROL FUMARATE DIHYDRATE 160; 4.5 UG/1; UG/1
160-4.5 AEROSOL RESPIRATORY (INHALATION) TWICE DAILY
Qty: 2 | Refills: 3 | Status: ACTIVE | COMMUNITY
Start: 2024-03-13 | End: 1900-01-01

## 2024-03-13 NOTE — HISTORY OF PRESENT ILLNESS
[de-identified] : Mr. VERO MENDENHALL is a 28 year old male with a Hx of asthma, Meckel's diverticulum, s/p small bowel resection (Jan 2019), presents for a follow up visit, and Rx refills He is doing well overall. Admits not watching his diet closely, and not exercising.  Denies any SOB, CP, abdominal pain, N/V/D, headache, dizziness, or leg swelling.

## 2024-03-13 NOTE — REVIEW OF SYSTEMS
[Negative] : Heme/Lymph [FreeTextEntry6] : Hx of asthma/depression [de-identified] : Hx of anxiety

## 2024-03-13 NOTE — PHYSICAL EXAM
[No Acute Distress] : no acute distress [Well Developed] : well developed [Well Nourished] : well nourished [Well-Appearing] : well-appearing [Normal Sclera/Conjunctiva] : normal sclera/conjunctiva [Normal Outer Ear/Nose] : the outer ears and nose were normal in appearance [No JVD] : no jugular venous distention [No Lymphadenopathy] : no lymphadenopathy [Supple] : supple [No Respiratory Distress] : no respiratory distress  [No Accessory Muscle Use] : no accessory muscle use [Clear to Auscultation] : lungs were clear to auscultation bilaterally [Normal Rate] : normal rate  [Regular Rhythm] : with a regular rhythm [Normal S1, S2] : normal S1 and S2 [No Murmur] : no murmur heard [Pedal Pulses Present] : the pedal pulses are present [No Edema] : there was no peripheral edema [No Extremity Clubbing/Cyanosis] : no extremity clubbing/cyanosis [Non Tender] : non-tender [Soft] : abdomen soft [Non-distended] : non-distended [No Masses] : no abdominal mass palpated [Normal Posterior Cervical Nodes] : no posterior cervical lymphadenopathy [Normal Anterior Cervical Nodes] : no anterior cervical lymphadenopathy [No Spinal Tenderness] : no spinal tenderness [No CVA Tenderness] : no CVA  tenderness [No Joint Swelling] : no joint swelling [Grossly Normal Strength/Tone] : grossly normal strength/tone [No Rash] : no rash [Coordination Grossly Intact] : coordination grossly intact [No Focal Deficits] : no focal deficits [Normal Gait] : normal gait [Normal Affect] : the affect was normal [Normal Insight/Judgement] : insight and judgment were intact

## 2024-03-13 NOTE — ASSESSMENT
[FreeTextEntry1] :  hx of asthma cont inhalers -renewed today follows with pulmonary  Hx of elevated cholesterol Reinforced the importance of following a low cholesterol/low fat diet we'll check lipids today   Hx of anxiety, depression-denies SI / HI, self harm not on meds sees therapist every other week  blood work ordered follow up in one week for lab results

## 2024-03-13 NOTE — ADDENDUM
[FreeTextEntry1] : I, Carolina Alvarenga, am scribing for and in the presence of Dr. Jennifer Castañeda, DO in the following sections HISTORY OF PRESENT ILLNESS, PAST MEDICAL/FAMILY/SOCIAL HISTORY; REVIEW OF SYSTEMS; VITAL SIGNS; PHYSICAL EXAM; ASSESSMENT/PLAN on  03/13/2024.  I personally performed the services described in the documentation, reviewed the documentation recorded by the scribe in my presence, and it accurately and completely records my words and actions.

## 2024-04-21 LAB
ALBUMIN SERPL ELPH-MCNC: 4.7 G/DL
ALP BLD-CCNC: 67 U/L
ALT SERPL-CCNC: 28 U/L
ANION GAP SERPL CALC-SCNC: 11 MMOL/L
AST SERPL-CCNC: 22 U/L
BILIRUB SERPL-MCNC: 0.4 MG/DL
BUN SERPL-MCNC: 15 MG/DL
CALCIUM SERPL-MCNC: 9.9 MG/DL
CHLORIDE SERPL-SCNC: 103 MMOL/L
CHOLEST SERPL-MCNC: 236 MG/DL
CO2 SERPL-SCNC: 27 MMOL/L
CREAT SERPL-MCNC: 0.99 MG/DL
EGFR: 106 ML/MIN/1.73M2
GLUCOSE SERPL-MCNC: 90 MG/DL
HDLC SERPL-MCNC: 54 MG/DL
LDLC SERPL CALC-MCNC: 153 MG/DL
NONHDLC SERPL-MCNC: 182 MG/DL
POTASSIUM SERPL-SCNC: 4.6 MMOL/L
PROT SERPL-MCNC: 7.8 G/DL
SODIUM SERPL-SCNC: 141 MMOL/L
TRIGL SERPL-MCNC: 158 MG/DL

## 2025-04-23 NOTE — H&P ADULT - PROBLEM SELECTOR PLAN 4
Patient with stable intermittent asthma, has not used Ventolin PRN in a long time  - currently without any symptoms
verbal cues/1 person assist